# Patient Record
Sex: FEMALE | Race: WHITE | HISPANIC OR LATINO | Employment: FULL TIME | URBAN - METROPOLITAN AREA
[De-identification: names, ages, dates, MRNs, and addresses within clinical notes are randomized per-mention and may not be internally consistent; named-entity substitution may affect disease eponyms.]

---

## 2018-01-10 ENCOUNTER — OFFICE VISIT (OUTPATIENT)
Dept: URGENT CARE | Facility: CLINIC | Age: 48
End: 2018-01-10
Payer: COMMERCIAL

## 2018-01-10 VITALS
DIASTOLIC BLOOD PRESSURE: 84 MMHG | BODY MASS INDEX: 32.85 KG/M2 | HEIGHT: 61 IN | WEIGHT: 174 LBS | HEART RATE: 92 BPM | OXYGEN SATURATION: 96 % | SYSTOLIC BLOOD PRESSURE: 126 MMHG | TEMPERATURE: 97.3 F | RESPIRATION RATE: 14 BRPM

## 2018-01-10 DIAGNOSIS — J11.1 INFLUENZA-LIKE ILLNESS: ICD-10-CM

## 2018-01-10 PROCEDURE — 99214 OFFICE O/P EST MOD 30 MIN: CPT | Performed by: PHYSICIAN ASSISTANT

## 2018-01-10 RX ORDER — IBUPROFEN 600 MG/1
TABLET ORAL
Qty: 30 TAB | Refills: 0 | Status: SHIPPED | OUTPATIENT
Start: 2018-01-10 | End: 2018-01-29

## 2018-01-10 ASSESSMENT — ENCOUNTER SYMPTOMS
FATIGUE: 0
CHILLS: 1
CHANGE IN BOWEL HABIT: 1
COUGH: 1
NAUSEA: 1
VERTIGO: 0
FEVER: 0
SORE THROAT: 1
VOMITING: 1

## 2018-01-10 NOTE — PROGRESS NOTES
"Subjective:      Didi Ontiveros is a 47 y.o. female who presents with Other (runny nose,congestion dizziness, and vomiting)            Influenza   This is a new problem. The current episode started in the past 7 days. The problem occurs constantly. The problem has been gradually improving. Associated symptoms include a change in bowel habit, chills, congestion, coughing, nausea, a sore throat and vomiting. Pertinent negatives include no fatigue, fever or vertigo.   Patient reports three-day history originally of body aches, chills, sore throat and cough. Denies known fevers. States those results on Monday. Then Monday she had vomiting. That subsided Tuesday. Tuesday and today she's had loose brown liquid like stools. No blood or mucus. Denies recent travel, denies recent antibiotic use denies known sick contacts    Review of Systems   Constitutional: Positive for chills. Negative for fatigue and fever.   HENT: Positive for congestion and sore throat.    Respiratory: Positive for cough.    Gastrointestinal: Positive for change in bowel habit, nausea and vomiting.   Neurological: Negative for vertigo.          Objective:     /84   Pulse 92   Temp 36.3 °C (97.3 °F)   Resp 14   Ht 1.549 m (5' 1\")   Wt 78.9 kg (174 lb)   SpO2 96%   BMI 32.88 kg/m²      Physical Exam       Gen.: Patient is A and O ×3, no acute distress, well-nourished well-hydrated  Vitals: Are listed and unremarkable  HEENT: Heads normocephalic, atraumatic, PERRLA, extraocular movements intact, TMs and oropharynx clear  Neck: Soft supple without cervical lymphadenopathy  Cardiovascular: Regular rate and rhythm normal S1 and S2. No murmurs, rubs or gallops  Lungs are clear to auscultation bilaterally. no wheezes rales or rhonchi  Abdomen is soft, nontender, nondistended with good bowel sounds, no hepatosplenomegaly  Skin: Is well perfused without evidence of rash or lesions  Neurological:  cranial nerves II through XII were assessed and " intact.  Musculoskeletal: Full range of motion, 5 out of 5 strength against resistance  Neurovascularly: Intact with a 2 second cap refill, good distal pulses      Assessment/Plan:     1. Influenza-like illness  Patient is improving. Discussed Tommie diet, will send ibuprofen to help supportive care measures. Cough has subsided. Patient appears well. Did discuss if the symptoms persist or worsen please follow up with us as needed. Patient defers note for work- ibuprofen (MOTRIN) 600 MG Tab; Take one pill up to three times a day as needed with food  Dispense: 30 Tab; Refill: 0

## 2018-01-29 ENCOUNTER — OFFICE VISIT (OUTPATIENT)
Dept: MEDICAL GROUP | Age: 48
End: 2018-01-29
Payer: COMMERCIAL

## 2018-01-29 ENCOUNTER — HOSPITAL ENCOUNTER (OUTPATIENT)
Facility: MEDICAL CENTER | Age: 48
End: 2018-01-29
Attending: FAMILY MEDICINE
Payer: COMMERCIAL

## 2018-01-29 VITALS
BODY MASS INDEX: 33.57 KG/M2 | HEIGHT: 61 IN | SYSTOLIC BLOOD PRESSURE: 124 MMHG | TEMPERATURE: 97.9 F | HEART RATE: 89 BPM | DIASTOLIC BLOOD PRESSURE: 70 MMHG | OXYGEN SATURATION: 97 % | WEIGHT: 177.8 LBS

## 2018-01-29 DIAGNOSIS — Z12.31 ENCOUNTER FOR SCREENING MAMMOGRAM FOR BREAST CANCER: ICD-10-CM

## 2018-01-29 DIAGNOSIS — Z12.11 COLON CANCER SCREENING: ICD-10-CM

## 2018-01-29 DIAGNOSIS — E66.9 OBESITY (BMI 30-39.9): ICD-10-CM

## 2018-01-29 DIAGNOSIS — N95.0 POSTMENOPAUSAL BLEEDING: ICD-10-CM

## 2018-01-29 DIAGNOSIS — N94.10 DYSPAREUNIA IN FEMALE: ICD-10-CM

## 2018-01-29 DIAGNOSIS — N95.0 POSTMENOPAUSAL BLEEDING: Primary | ICD-10-CM

## 2018-01-29 DIAGNOSIS — G47.33 OSA (OBSTRUCTIVE SLEEP APNEA): ICD-10-CM

## 2018-01-29 DIAGNOSIS — G56.02 CARPAL TUNNEL SYNDROME OF LEFT WRIST: ICD-10-CM

## 2018-01-29 DIAGNOSIS — Z00.00 PE (PHYSICAL EXAM), ANNUAL: ICD-10-CM

## 2018-01-29 PROBLEM — R68.89 FORGETFULNESS: Status: ACTIVE | Noted: 2018-01-29

## 2018-01-29 PROBLEM — Z87.42 HX OF OVARIAN CYST: Status: ACTIVE | Noted: 2018-01-29

## 2018-01-29 LAB
INT CON NEG: NEGATIVE
INT CON POS: POSITIVE
POC URINE PREGNANCY TEST: NEGATIVE

## 2018-01-29 PROCEDURE — 87510 GARDNER VAG DNA DIR PROBE: CPT

## 2018-01-29 PROCEDURE — 87660 TRICHOMONAS VAGIN DIR PROBE: CPT

## 2018-01-29 PROCEDURE — 87591 N.GONORRHOEAE DNA AMP PROB: CPT

## 2018-01-29 PROCEDURE — 81025 URINE PREGNANCY TEST: CPT | Performed by: FAMILY MEDICINE

## 2018-01-29 PROCEDURE — 99215 OFFICE O/P EST HI 40 MIN: CPT | Mod: 25 | Performed by: FAMILY MEDICINE

## 2018-01-29 PROCEDURE — 99000 SPECIMEN HANDLING OFFICE-LAB: CPT | Performed by: FAMILY MEDICINE

## 2018-01-29 PROCEDURE — 87491 CHLMYD TRACH DNA AMP PROBE: CPT

## 2018-01-29 PROCEDURE — 87480 CANDIDA DNA DIR PROBE: CPT

## 2018-01-29 ASSESSMENT — PATIENT HEALTH QUESTIONNAIRE - PHQ9
5. POOR APPETITE OR OVEREATING: 0 - NOT AT ALL
CLINICAL INTERPRETATION OF PHQ2 SCORE: 1
SUM OF ALL RESPONSES TO PHQ QUESTIONS 1-9: 4

## 2018-01-30 ENCOUNTER — TELEPHONE (OUTPATIENT)
Dept: MEDICAL GROUP | Age: 48
End: 2018-01-30

## 2018-01-30 LAB
C TRACH DNA SPEC QL NAA+PROBE: NEGATIVE
CANDIDA DNA VAG QL PROBE+SIG AMP: NEGATIVE
G VAGINALIS DNA VAG QL PROBE+SIG AMP: NEGATIVE
N GONORRHOEA DNA SPEC QL NAA+PROBE: NEGATIVE
SPECIMEN SOURCE: NORMAL
T VAGINALIS DNA VAG QL PROBE+SIG AMP: NEGATIVE

## 2018-01-30 NOTE — PATIENT INSTRUCTIONS
Osteoporosis  Osteoporosis is the thinning and loss of density in the bones. Osteoporosis makes the bones more brittle, fragile, and likely to break (fracture). Over time, osteoporosis can cause the bones to become so weak that they fracture after a simple fall. The bones most likely to fracture are the bones in the hip, wrist, and spine.  CAUSES   The exact cause is not known.  RISK FACTORS  Anyone can develop osteoporosis. You may be at greater risk if you have a family history of the condition or have poor nutrition. You may also have a higher risk if you are:   · Female.    · 50 years old or older.  · A smoker.  · Not physically active.    · White or .  · Slender.  SIGNS AND SYMPTOMS   A fracture might be the first sign of the disease, especially if it results from a fall or injury that would not usually cause a bone to break. Other signs and symptoms include:   · Low back and neck pain.  · Stooped posture.  · Height loss.  DIAGNOSIS   To make a diagnosis, your health care provider may:  · Take a medical history.  · Perform a physical exam.  · Order tests, such as:  ¨ A bone mineral density test.  ¨ A dual-energy X-ray absorptiometry test.  TREATMENT   The goal of osteoporosis treatment is to strengthen your bones to reduce your risk of a fracture. Treatment may involve:  · Making lifestyle changes, such as:  ¨ Eating a diet rich in calcium.  ¨ Doing weight-bearing and muscle-strengthening exercises.  ¨ Stopping tobacco use.  ¨ Limiting alcohol intake.  · Taking medicine to slow the process of bone loss or to increase bone density.  · Monitoring your levels of calcium and vitamin D.  HOME CARE INSTRUCTIONS  · Include calcium and vitamin D in your diet. Calcium is important for bone health, and vitamin D helps the body absorb calcium.  · Perform weight-bearing and muscle-strengthening exercises as directed by your health care provider.  · Do not use any tobacco products, including cigarettes, chewing  tobacco, and electronic cigarettes. If you need help quitting, ask your health care provider.  · Limit your alcohol intake.  · Take medicines only as directed by your health care provider.  · Keep all follow-up visits as directed by your health care provider. This is important.  · Take precautions at home to lower your risk of falling, such as:  ¨ Keeping rooms well lit and clutter free.  ¨ Installing safety rails on stairs.  ¨ Using rubber mats in the bathroom and other areas that are often wet or slippery.  SEEK IMMEDIATE MEDICAL CARE IF:   You fall or injure yourself.      This information is not intended to replace advice given to you by your health care provider. Make sure you discuss any questions you have with your health care provider.     Document Released: 09/27/2006 Document Revised: 01/08/2016 Document Reviewed: 05/28/2015  [employer] Interactive Patient Education ©2016 [employer]

## 2018-01-30 NOTE — PROGRESS NOTES
Subjective:   CC: establish care    HPI:     Didi Ontiveros is a 48 y.o. female, established patient of the clinic, presents with the following concerns:     Pt is  sexually active, using condoms for contraception. Pt states that she stopped having menstruation for 12 months. She was doing well until 2017 when she suffers acute, heavy vaginal bleeding for 24 hours. Vaginal bleeding stopped after that. She denies pelvis pain, hx of STD, abnormal vaginal discharge, unexplained weight loss, depression or familial history of gynecological malignancy. She endorses new onset of dyspareunia approximately 4 months ago. She states that she has deep pelvis pain during sex if she lies on her back. Other position during sex does not bother her. She has hx of ovarian cyst, s/p surgical removal in . She otherwise doing well.     She also c/o abnormal weight gain over the past years. She tries to eat better, but it is not easy as she is very busy at work. She sometimes forgets lunch and eats more during dinner. She is active, but no regular exercise. She is planning to join the gym in a few weeks. She is interested in [facility] referral for weight loss.     Patient complains of chronic poor memory, forgetfulness, poor concentration, excessive sleepiness during the day, feeling unrefreshed in the morning after 6-8 hours of sleep, excessive nocturnal snoring, frequent episodes of nocturnal arousal gasping for air.     She complains of intermittent left wrist tenderness and numbness/tingling sensation frequently felt along the left first to third digits with certain motion or movement of the left hand. Pt is right-handed. She works for [employer]. She endorses excessive computer usage at work. She also carries heavy boxes from times to times. Denies hx of trauma or surgery of the left wrist, left hand weakness or poor coordination. Left elbow/shouler/neck are asymptomatic.     Current medicines (including  "changes today)  Current Outpatient Prescriptions   Medication Sig Dispense Refill   • Diclofenac Sodium 1 % Gel Apply to 2-4 gram to affected area 4 times daily 100 g 1     No current facility-administered medications for this visit.      She  has no past medical history on file.    I personally reviewed patient's problem list, allergies, medications, family hx, social hx with patient and update EPIC.     REVIEW OF SYSTEMS:  CONSTITUTIONAL:  Denies night sweats, fatigue, malaise, lethargy, fever or chills.  RESPIRATORY:  Denies cough, wheeze, hemoptysis, or shortness of breath.  CARDIOVASCULAR:  Denies chest pains, palpitations, pedal edema     Objective:     Blood pressure 124/70, pulse 89, temperature 36.6 °C (97.9 °F), height 1.554 m (5' 1.2\"), weight 80.6 kg (177 lb 12.8 oz), SpO2 97 %. Body mass index is 33.38 kg/m².    Physical Exam:  Constitutional: awake, alert, in no distress.  Skin: Warm, dry, good turgor, no rashes, bruises, ulcers in visible areas.  Eye: conjunctiva clear, lids neg for edema or lesions.  Neck: Trachea midline, no masses, no thyromegaly. No cervical or supraclavicular lymphadenopathy  Respiratory: Unlabored respiratory effort, lungs clear to auscultation, no wheezes, no rhonchi, no rales.  Cardiovascular: Normal S1, S2, no murmur, no pedal edema.  Abdomen: Soft, non-tender to palpation, active BS, no hernia, no hepatosplenomegaly, negative rebound or guarding.   Psych: Oriented x3, affect and mood wnl, intact judgement and insight.   MSK: No visible deformity or concerning lesion on the hands bilaterally. Hands are warm and well-perfused. Positive Phalen's and Tinel's test at the left wrist, negative thenar atrophy, intact neuromuscular exam.     Assessment and Plan:   The following treatment plan was discussed    1. Postmenopausal bleeding   sexually active, menopausal, who presents with post-menopausal bleeding for 24 hours. Pt presentation is concerning for endometrial hyperplasia " or malignancy. In-office urine pregnancy test was negative. Pelvis exam was not performed due to time constraint. Will do pelvis exam at next visit for pap.  Plans:   - US-OB PELVIS TRANSVAGINAL; Future  - CHLAMYDIA/GC PCR URINE OR SWAB; Future  - VAGINAL PATHOGENS DNA PANEL; Future    2. Obesity (BMI 30-39.9)  - Patient identified as having weight management issue.  Appropriate orders and counseling given.  - REFERRAL TO [facility] (HIP[facility] Services Requested: [facility] Staff to Evaluate Best Program; Reason for Visit: Overweight/Obesity    3. Dyspareunia in female  - US-OB PELVIS TRANSVAGINAL; Future  - CHLAMYDIA/GC PCR URINE OR SWAB; Future  - VAGINAL PATHOGENS DNA PANEL; Future    4. Encounter for screening mammogram for breast cancer  - MA-SCREEN MAMMO W/CAD-BILAT; Future    5. Colon cancer screening  - REFERRAL TO GI FOR COLONOSCOPY    6. ROMEO  History and exam is concerning for obstructive sleep apnea. Plans:  - OVERNIGHT OXIMETRY; Future  - Weight loss    7. Carpal tunnel syndrome of left wrist  History and exam is concerning for carpal tunnel of the left wrist. Plans:  - Diclofenac Sodium 1 % Gel; Apply to 2-4 gram to affected area 4 times daily  Dispense: 100 g; Refill: 1  - home rehab exercises provided  - wrist splint   - f/u in 3 months     Total 40 minutes face-to-face time spent with patient, with greater than 50% of the total time discussing patient's issues and symptoms as listed above in assessment and plan, as well as managing coordination of care for future evaluation and treatment.      Ashley Hook M.D.      Followup: Return in about 3 months (around 4/29/2018) for Multiple issues, Pap.    Please note that this dictation was created using voice recognition software. I have made every reasonable attempt to correct obvious errors, but I expect that there are errors of grammar and possibly content that I did not discover before finalizing the note.

## 2018-02-20 ENCOUNTER — TELEPHONE (OUTPATIENT)
Dept: MEDICAL GROUP | Age: 48
End: 2018-02-20

## 2018-02-21 NOTE — TELEPHONE ENCOUNTER
MEDICATION PRIOR AUTHORIZATION NEEDED:    1. Name of Medication: Diclofenac Sodium 1 % Gel    2. Requested By (Name of Pharmacy):   [id number] - [place], [place] - [address]  [address] [place] [address]  Phone: [phone] Fax: [phone]     3. Is insurance on file current? Cardholder ID [id number]     4. What is the name & phone number of the 3rd party payor? [facility] [phone]    Would you like a PAR started?

## 2018-02-23 DIAGNOSIS — E66.9 OBESITY (BMI 30-39.9): ICD-10-CM

## 2018-02-28 NOTE — TELEPHONE ENCOUNTER
DOCUMENTATION OF PAR STATUS:    1. Name of Medication & Dose: Diclofenac Sodium 1 % Gel     2. Name of Prescription Coverage Company & phone #: [email or web address] [phone]    3. Date Prior Auth Submitted: 02/27/18    4. What information was given to obtain insurance decision? Diagnosis code    5. Prior Auth Status? Pending    6. Patient Notified: no

## 2018-03-02 ENCOUNTER — TELEPHONE (OUTPATIENT)
Dept: MEDICAL GROUP | Age: 48
End: 2018-03-02

## 2018-03-03 NOTE — TELEPHONE ENCOUNTER
Phone Number Called: [phone] (home)       Message: LM for patient that her insurance denied the diclofenac gel.  Dr. Hook can print [facility].      Left Message for patient to call back: yes

## 2018-05-18 ENCOUNTER — TELEPHONE (OUTPATIENT)
Dept: MEDICAL GROUP | Age: 48
End: 2018-05-18

## 2018-05-18 NOTE — TELEPHONE ENCOUNTER
2. Overnight Pulse Oximetry paperwork received from [employer] requiring provider signature.     3. All appropriate fields completed by Medical Assistant: Yes    4. Paperwork NOTE ATTACHED TO ENCOUNTER: [facility]- Unable to schedule patient .  Called and spoke with patient, she informed she thought they were going to call her back, since she had missed them the first time when they came to drop office device. Patient was provided with contact information to call and schedule OPO test.

## 2018-05-23 ENCOUNTER — HOSPITAL ENCOUNTER (OUTPATIENT)
Dept: RADIOLOGY | Facility: MEDICAL CENTER | Age: 48
End: 2018-05-23
Attending: FAMILY MEDICINE
Payer: COMMERCIAL

## 2018-05-23 ENCOUNTER — HOSPITAL ENCOUNTER (OUTPATIENT)
Dept: RADIOLOGY | Facility: MEDICAL CENTER | Age: 48
End: 2018-05-23

## 2018-05-23 DIAGNOSIS — N95.0 POSTMENOPAUSAL BLEEDING: ICD-10-CM

## 2018-05-23 DIAGNOSIS — Z12.31 ENCOUNTER FOR SCREENING MAMMOGRAM FOR BREAST CANCER: ICD-10-CM

## 2018-05-23 DIAGNOSIS — N94.10 DYSPAREUNIA IN FEMALE: ICD-10-CM

## 2018-05-23 PROCEDURE — 76830 TRANSVAGINAL US NON-OB: CPT

## 2018-05-23 PROCEDURE — 77067 SCR MAMMO BI INCL CAD: CPT

## 2018-05-24 DIAGNOSIS — R93.89 THICKENED ENDOMETRIUM: ICD-10-CM

## 2018-05-24 LAB
25(OH)D3+25(OH)D2 SERPL-MCNC: 23.9 NG/ML (ref 30–100)
ALBUMIN SERPL-MCNC: 4.7 G/DL (ref 3.5–5.5)
ALBUMIN/GLOB SERPL: 1.6 {RATIO} (ref 1.2–2.2)
ALP SERPL-CCNC: 88 IU/L (ref 39–117)
ALT SERPL-CCNC: 118 IU/L (ref 0–32)
AST SERPL-CCNC: 60 IU/L (ref 0–40)
BASOPHILS # BLD AUTO: 0.1 X10E3/UL (ref 0–0.2)
BASOPHILS NFR BLD AUTO: 1 %
BILIRUB SERPL-MCNC: 0.4 MG/DL (ref 0–1.2)
BUN SERPL-MCNC: 9 MG/DL (ref 6–24)
BUN/CREAT SERPL: 13 (ref 9–23)
CALCIUM SERPL-MCNC: 9.4 MG/DL (ref 8.7–10.2)
CHLORIDE SERPL-SCNC: 101 MMOL/L (ref 96–106)
CHOLEST SERPL-MCNC: 191 MG/DL (ref 100–199)
CO2 SERPL-SCNC: 24 MMOL/L (ref 18–29)
CREAT SERPL-MCNC: 0.69 MG/DL (ref 0.57–1)
EOSINOPHIL # BLD AUTO: 0.2 X10E3/UL (ref 0–0.4)
EOSINOPHIL NFR BLD AUTO: 2 %
ERYTHROCYTE [DISTWIDTH] IN BLOOD BY AUTOMATED COUNT: 13.9 % (ref 12.3–15.4)
GFR SERPLBLD CREATININE-BSD FMLA CKD-EPI: 102 ML/MIN/1.73
GFR SERPLBLD CREATININE-BSD FMLA CKD-EPI: 117 ML/MIN/1.73
GLOBULIN SER CALC-MCNC: 3 G/DL (ref 1.5–4.5)
GLUCOSE SERPL-MCNC: 88 MG/DL (ref 65–99)
HBA1C MFR BLD: 6.2 % (ref 4.8–5.6)
HCT VFR BLD AUTO: 43.6 % (ref 34–46.6)
HDLC SERPL-MCNC: 36 MG/DL
HGB BLD-MCNC: 15 G/DL (ref 11.1–15.9)
IMM GRANULOCYTES # BLD: 0 X10E3/UL (ref 0–0.1)
IMM GRANULOCYTES NFR BLD: 0 %
IMMATURE CELLS  115398: NORMAL
LABORATORY COMMENT REPORT: ABNORMAL
LDLC SERPL CALC-MCNC: 109 MG/DL (ref 0–99)
LYMPHOCYTES # BLD AUTO: 2.7 X10E3/UL (ref 0.7–3.1)
LYMPHOCYTES NFR BLD AUTO: 28 %
MCH RBC QN AUTO: 31.3 PG (ref 26.6–33)
MCHC RBC AUTO-ENTMCNC: 34.4 G/DL (ref 31.5–35.7)
MCV RBC AUTO: 91 FL (ref 79–97)
MONOCYTES # BLD AUTO: 0.5 X10E3/UL (ref 0.1–0.9)
MONOCYTES NFR BLD AUTO: 6 %
MORPHOLOGY BLD-IMP: NORMAL
NEUTROPHILS # BLD AUTO: 6.1 X10E3/UL (ref 1.4–7)
NEUTROPHILS NFR BLD AUTO: 63 %
NRBC BLD AUTO-RTO: NORMAL %
PLATELET # BLD AUTO: 307 X10E3/UL (ref 150–379)
POTASSIUM SERPL-SCNC: 3.8 MMOL/L (ref 3.5–5.2)
PROT SERPL-MCNC: 7.7 G/DL (ref 6–8.5)
RBC # BLD AUTO: 4.8 X10E6/UL (ref 3.77–5.28)
SODIUM SERPL-SCNC: 142 MMOL/L (ref 134–144)
TRIGL SERPL-MCNC: 230 MG/DL (ref 0–149)
TSH SERPL DL<=0.005 MIU/L-ACNC: 1.64 UIU/ML (ref 0.45–4.5)
VLDLC SERPL CALC-MCNC: 46 MG/DL (ref 5–40)
WBC # BLD AUTO: 9.5 X10E3/UL (ref 3.4–10.8)

## 2018-05-25 PROBLEM — R73.03 PREDIABETES: Status: ACTIVE | Noted: 2018-05-25

## 2018-05-25 PROBLEM — R79.89 ELEVATED LFTS: Status: ACTIVE | Noted: 2018-05-25

## 2018-05-25 PROBLEM — E55.9 VITAMIN D INSUFFICIENCY: Status: ACTIVE | Noted: 2018-05-25

## 2018-05-25 PROBLEM — E78.00 PURE HYPERCHOLESTEROLEMIA: Status: ACTIVE | Noted: 2018-05-25

## 2018-05-31 DIAGNOSIS — G47.33 OSA (OBSTRUCTIVE SLEEP APNEA): ICD-10-CM

## 2018-06-04 ENCOUNTER — OFFICE VISIT (OUTPATIENT)
Dept: MEDICAL GROUP | Age: 48
End: 2018-06-04
Payer: COMMERCIAL

## 2018-06-04 VITALS
TEMPERATURE: 97.7 F | HEIGHT: 61 IN | OXYGEN SATURATION: 94 % | WEIGHT: 179 LBS | SYSTOLIC BLOOD PRESSURE: 120 MMHG | HEART RATE: 84 BPM | BODY MASS INDEX: 33.79 KG/M2 | DIASTOLIC BLOOD PRESSURE: 78 MMHG

## 2018-06-04 DIAGNOSIS — E66.9 OBESITY (BMI 30-39.9): ICD-10-CM

## 2018-06-04 DIAGNOSIS — E78.00 PURE HYPERCHOLESTEROLEMIA: ICD-10-CM

## 2018-06-04 DIAGNOSIS — G56.02 CARPAL TUNNEL SYNDROME OF LEFT WRIST: ICD-10-CM

## 2018-06-04 DIAGNOSIS — Z23 NEED FOR TDAP VACCINATION: ICD-10-CM

## 2018-06-04 DIAGNOSIS — G47.33 OSA (OBSTRUCTIVE SLEEP APNEA): Primary | ICD-10-CM

## 2018-06-04 DIAGNOSIS — K75.81 NASH (NONALCOHOLIC STEATOHEPATITIS): ICD-10-CM

## 2018-06-04 DIAGNOSIS — R73.03 PREDIABETES: ICD-10-CM

## 2018-06-04 DIAGNOSIS — R93.89 THICKENED ENDOMETRIUM: ICD-10-CM

## 2018-06-04 PROBLEM — R68.89 FORGETFULNESS: Status: RESOLVED | Noted: 2018-01-29 | Resolved: 2018-06-04

## 2018-06-04 PROCEDURE — 99214 OFFICE O/P EST MOD 30 MIN: CPT | Mod: 25 | Performed by: FAMILY MEDICINE

## 2018-06-04 PROCEDURE — 90715 TDAP VACCINE 7 YRS/> IM: CPT | Performed by: FAMILY MEDICINE

## 2018-06-04 PROCEDURE — 90471 IMMUNIZATION ADMIN: CPT | Performed by: FAMILY MEDICINE

## 2018-06-04 RX ORDER — METFORMIN HYDROCHLORIDE 500 MG/1
500 TABLET, EXTENDED RELEASE ORAL 2 TIMES DAILY
Qty: 180 TAB | Refills: 1 | Status: SHIPPED | OUTPATIENT
Start: 2018-06-04 | End: 2019-05-08

## 2018-06-04 RX ORDER — OMEGA-3 FATTY ACIDS CAP DELAYED RELEASE 1000 MG 1000 MG
1000 CAPSULE DELAYED RELEASE ORAL 2 TIMES DAILY
Qty: 180 CAP | Refills: 1 | Status: SHIPPED | OUTPATIENT
Start: 2018-06-04

## 2018-06-04 NOTE — PATIENT INSTRUCTIONS
Call this number to schedule appointment with OBGYN and with sleep medicine:   [facility]  Phone: [phone]

## 2018-06-08 ENCOUNTER — TELEPHONE (OUTPATIENT)
Dept: MEDICAL GROUP | Age: 48
End: 2018-06-08

## 2018-06-08 NOTE — TELEPHONE ENCOUNTER
1. Caller Name: [employer]                                               Patient approves a detailed voicemail message: N\A    recieved a fax from [employer] stating pt has diabetes and is not on statin therapy

## 2018-12-04 ENCOUNTER — APPOINTMENT (OUTPATIENT)
Dept: MEDICAL GROUP | Age: 48
End: 2018-12-04
Payer: COMMERCIAL

## 2019-02-01 ENCOUNTER — APPOINTMENT (OUTPATIENT)
Dept: MEDICAL GROUP | Age: 49
End: 2019-02-01
Payer: COMMERCIAL

## 2019-05-05 ENCOUNTER — OFFICE VISIT (OUTPATIENT)
Dept: URGENT CARE | Facility: PHYSICIAN GROUP | Age: 49
End: 2019-05-05
Payer: COMMERCIAL

## 2019-05-05 VITALS
SYSTOLIC BLOOD PRESSURE: 118 MMHG | HEIGHT: 61 IN | BODY MASS INDEX: 34.17 KG/M2 | TEMPERATURE: 99 F | WEIGHT: 181 LBS | RESPIRATION RATE: 16 BRPM | DIASTOLIC BLOOD PRESSURE: 74 MMHG | OXYGEN SATURATION: 94 %

## 2019-05-05 DIAGNOSIS — M54.32 LEFT SIDED SCIATICA: ICD-10-CM

## 2019-05-05 PROCEDURE — 99214 OFFICE O/P EST MOD 30 MIN: CPT | Performed by: PHYSICIAN ASSISTANT

## 2019-05-05 RX ORDER — METHOCARBAMOL 500 MG/1
TABLET, FILM COATED ORAL
Qty: 30 TAB | Refills: 0 | Status: SHIPPED | OUTPATIENT
Start: 2019-05-05 | End: 2019-06-10

## 2019-05-05 RX ORDER — NAPROXEN 500 MG/1
500 TABLET ORAL 2 TIMES DAILY WITH MEALS
Qty: 30 TAB | Refills: 0 | Status: SHIPPED | OUTPATIENT
Start: 2019-05-05 | End: 2019-06-10

## 2019-05-05 RX ORDER — KETOROLAC TROMETHAMINE 30 MG/ML
60 INJECTION, SOLUTION INTRAMUSCULAR; INTRAVENOUS ONCE
Status: COMPLETED | OUTPATIENT
Start: 2019-05-05 | End: 2019-05-05

## 2019-05-05 RX ADMIN — KETOROLAC TROMETHAMINE 60 MG: 30 INJECTION, SOLUTION INTRAMUSCULAR; INTRAVENOUS at 16:22

## 2019-05-05 ASSESSMENT — ENCOUNTER SYMPTOMS
CONSTITUTIONAL NEGATIVE: 1
RESPIRATORY NEGATIVE: 1
NEUROLOGICAL NEGATIVE: 1
GASTROINTESTINAL NEGATIVE: 1
CARDIOVASCULAR NEGATIVE: 1

## 2019-05-05 NOTE — PROGRESS NOTES
Subjective:      Didi Ontiveros is a 49 y.o. female who presents with Low Back Pain (left side lbp that travels down to the knee since Wednesday. )        HPI   Patient presents today for about 5 days of left lower back/buttock pain. Sometimes the pain radiates around to the hip and sometimes it radiates along the thigh to the knee. No lower leg pain or swelling.  No numbness, tingling or weakness of the legs.  She denies any specific injury but has been going to the gym more. She states she does not recall anything specifically she may have done to cause this.  3 weeks ago she had a similar, more mild episode.  She took Advil and applied some cream and that helped.   She has attempted the same this time but has not seemed to help.    She notes it is worse with walking, bending and twisting certain ways and turning over in bed.  No chronic back pain or issues.     Review of Systems   Constitutional: Negative.    Respiratory: Negative.    Cardiovascular: Negative.    Gastrointestinal: Negative.    Genitourinary: Negative.    Musculoskeletal:        SEE HPI   Skin: Negative.    Neurological: Negative.    Endo/Heme/Allergies: Negative.        PMH:  has no past medical history on file.  MEDS:   Current Outpatient Prescriptions:   •  naproxen (NAPROSYN) 500 MG Tab, Take 1 Tab by mouth 2 times a day, with meals. PRN, Disp: 30 Tab, Rfl: 0  •  methocarbamol (ROBAXIN) 500 MG Tab, 1-2 tablets at bedtime prn, no driving, Disp: 30 Tab, Rfl: 0  •  Diclofenac Sodium 1 % Gel, Apply to 2-4 gram to affected area 4 times daily, Disp: 100 g, Rfl: 1  •  metFORMIN ER (GLUCOPHAGE XR) 500 MG TABLET SR 24 HR, Take 1 Tab by mouth 2 times a day., Disp: 180 Tab, Rfl: 1  •  Omega-3 Fatty Acids (FISH OIL) 1000 MG CAPSULE DELAYED RELEASE EC softgel capsule, Take 1 Cap by mouth 2 Times a Day., Disp: 180 Cap, Rfl: 1  ALLERGIES:   Allergies   Allergen Reactions   • Pcn [Penicillins]      SURGHX:   Past Surgical History:   Procedure Laterality  "Date   • PRIMARY C SECTION       SOCHX:  reports that she has never smoked. She has never used smokeless tobacco. She reports that she does not drink alcohol or use drugs.  FH: Family history was reviewed, no pertinent findings to report   Objective:     /74   Temp 37.2 °C (99 °F) (Temporal)   Resp 16   Ht 1.549 m (5' 1\")   Wt 82.1 kg (181 lb)   SpO2 94%   BMI 34.20 kg/m²      Physical Exam   Constitutional: She is oriented to person, place, and time. She appears well-developed and well-nourished. No distress.   HENT:   Head: Normocephalic and atraumatic.   Right Ear: External ear normal.   Left Ear: External ear normal.   Eyes: Conjunctivae and EOM are normal.   Neck: Normal range of motion. Neck supple.   Cardiovascular: Normal rate and regular rhythm.    Pulmonary/Chest: Effort normal and breath sounds normal.   Musculoskeletal:        Back:    Neurological: She is alert and oriented to person, place, and time.   Skin: Skin is warm and dry.   Psychiatric: She has a normal mood and affect. Her behavior is normal.   Vitals reviewed.              Assessment/Plan:     1. Left sided sciatica  ketorolac (TORADOL) injection 60 mg    naproxen (NAPROSYN) 500 MG Tab    methocarbamol (ROBAXIN) 500 MG Tab         -Toradol shot given in clinic, patient tolerated well.  Monitored for 10 mins s/p shot.   -back care discussed, proper lifting mechanics discussed  -recommend heat/ice prn.  Gentle stretches daily.   -Robaxin if needed for bedtime/spasms. No driving, caution drowsy.   -back pain red flags and ER precautions discussed with patient.   -patient has appt in 1 month with her PCP and will keep this.  RTC precautions in meantime.       Supportive care, differential diagnoses, and indications for immediate follow-up discussed with patient.   Pathogenesis of diagnosis discussed including typical length and natural progression.   Instructed to return to clinic or nearest emergency department for any change in " condition, further concerns, or worsening of symptoms.  Patient states understanding of the plan of care and discharge instructions.      Celina Ferreira P.A.-C.

## 2019-05-05 NOTE — LETTER
May 5, 2019         Patient: Didi Ontiveros   YOB: 1970   Date of Visit: 5/5/2019           To Whom it May Concern:    Didi Ontiveros was seen in my clinic on 5/5/2019.  Please excuse her from missed work tomorrow.    If you have any questions or concerns, please don't hesitate to call.        Sincerely,           Celina Ferreira P.A.-C.  Electronically Signed

## 2019-05-08 ENCOUNTER — OFFICE VISIT (OUTPATIENT)
Dept: MEDICAL GROUP | Age: 49
End: 2019-05-08
Payer: COMMERCIAL

## 2019-05-08 VITALS
TEMPERATURE: 98 F | BODY MASS INDEX: 35.12 KG/M2 | SYSTOLIC BLOOD PRESSURE: 104 MMHG | HEART RATE: 65 BPM | DIASTOLIC BLOOD PRESSURE: 66 MMHG | WEIGHT: 186 LBS | OXYGEN SATURATION: 93 % | HEIGHT: 61 IN

## 2019-05-08 DIAGNOSIS — N95.0 POSTMENOPAUSAL BLEEDING: ICD-10-CM

## 2019-05-08 DIAGNOSIS — G56.02 CARPAL TUNNEL SYNDROME OF LEFT WRIST: ICD-10-CM

## 2019-05-08 DIAGNOSIS — K75.81 NASH (NONALCOHOLIC STEATOHEPATITIS): ICD-10-CM

## 2019-05-08 DIAGNOSIS — Z00.00 PE (PHYSICAL EXAM), ANNUAL: Primary | ICD-10-CM

## 2019-05-08 DIAGNOSIS — R93.89 THICKENED ENDOMETRIUM: ICD-10-CM

## 2019-05-08 DIAGNOSIS — S39.012A LUMBOSACRAL STRAIN, INITIAL ENCOUNTER: ICD-10-CM

## 2019-05-08 DIAGNOSIS — E66.9 OBESITY (BMI 30-39.9): ICD-10-CM

## 2019-05-08 DIAGNOSIS — R73.03 PREDIABETES: ICD-10-CM

## 2019-05-08 DIAGNOSIS — E78.00 PURE HYPERCHOLESTEROLEMIA: ICD-10-CM

## 2019-05-08 DIAGNOSIS — G47.33 OSA (OBSTRUCTIVE SLEEP APNEA): ICD-10-CM

## 2019-05-08 PROBLEM — N94.10 DYSPAREUNIA IN FEMALE: Status: RESOLVED | Noted: 2018-01-29 | Resolved: 2019-05-08

## 2019-05-08 PROBLEM — E55.9 VITAMIN D INSUFFICIENCY: Status: RESOLVED | Noted: 2018-05-25 | Resolved: 2019-05-08

## 2019-05-08 PROBLEM — Z87.42 HX OF OVARIAN CYST: Status: RESOLVED | Noted: 2018-01-29 | Resolved: 2019-05-08

## 2019-05-08 PROCEDURE — 99396 PREV VISIT EST AGE 40-64: CPT | Performed by: FAMILY MEDICINE

## 2019-05-08 ASSESSMENT — PATIENT HEALTH QUESTIONNAIRE - PHQ9: CLINICAL INTERPRETATION OF PHQ2 SCORE: 0

## 2019-05-08 NOTE — PROGRESS NOTES
Subjective:   CC: Annual PE    HPI:     Didi Ontiveros is a 49 y.o. female who is an established patient of the clinic, presents with the following concerns:     Overall, the patient is doing well today with the exception of acute low back pain. One week ago, she developed an acute onset of left sided low back pain. She has started going to the gym two months ago but denies any heavy lifting, trauma, injury or falls. Her pain has gradually worsened and is described as a deep burning pain radiating to her left groin and down her right posterior lower extremity. She was treated with a Toradol injection by urgent care which resolved her pain for one day. Her pain has now returned and is described as constant with minimal improvement with Naproxen. No groin numbness, bowel or bladder incontinence, lower extremity weakness or tingling.     She was previously diagnosed with carpal tunnel syndrome of the left wrist. She continues to utilize night splints and exercises for her left wrist regularly. Her pain is under good control at this time and does not feel that she requires surgical intervention at this time.      She has a history of hypercholesterolemia which is managed via diet modification and exercise. She is not currently on a statin but does take an omega 3 fatty acid supplement on a daily basis. Additionally, she has a history of nonalcoholic steatohepatitis. No acute complaints or concerns were reported.    Prior history of prediabetes. Her A1c was 6.2 on 05/23/18. No recent labs were completed to reevaluate her glycohemoglobin. She was previously prescribed Metformin but did not start this medication. She forgot about this medication stating she was under significant stress one year ago. She has started exercising regularly two months ago at a gym. She reports a 5 lbs weight loss one week ago. She is interested in attending a weight loss management program.     When she was evaluated last in 06/2018, she  "complained of vaginal bleeding which resolved after passing a large clot. She did not follow up with gynecology for further evaluation however. No recurrent vaginal bleeding, pelvic pain, genital rash, abnormal vaginal discharge or unexplained weight loss. No personal or familial history of breast/gynecological malignancy. She is due for a pap smear.      Current medicines (including changes today)  Current Outpatient Prescriptions   Medication Sig Dispense Refill   • naproxen (NAPROSYN) 500 MG Tab Take 1 Tab by mouth 2 times a day, with meals. PRN 30 Tab 0   • methocarbamol (ROBAXIN) 500 MG Tab 1-2 tablets at bedtime prn, no driving 30 Tab 0   • Omega-3 Fatty Acids (FISH OIL) 1000 MG CAPSULE DELAYED RELEASE EC softgel capsule Take 1 Cap by mouth 2 Times a Day. 180 Cap 1     No current facility-administered medications for this visit.      She  has no past medical history on file.    I personally reviewed patient's problem list, allergies, medications, family hx, social hx with patient and update EPIC.     REVIEW OF SYSTEMS:  CONSTITUTIONAL:  Denies night sweats, fatigue, malaise, lethargy, fever or chills.  RESPIRATORY:  Denies cough, wheeze, hemoptysis or shortness of breath.  CARDIOVASCULAR:  Denies chest pains, palpitations or pedal edema.  : Denies vaginal bleeding, pelvic pain, genital rash or abnormal vaginal discharge.  MUSCULOSKELETAL: Denies trauma, injury or falls.  NEUROLOGICAL: Denies groin numbness, bowel or bladder incontinence, lower extremity weakness or tingling.     See HPI for further details.         Objective:     /66 (BP Location: Right arm, Patient Position: Sitting, BP Cuff Size: Adult)   Pulse 65   Temp 36.7 °C (98 °F) (Temporal)   Ht 1.549 m (5' 1\")   Wt 84.4 kg (186 lb)   SpO2 93%  Body mass index is 35.14 kg/m².    Physical Exam:  Constitutional: awake, alert, in no distress, obese  Skin: Warm, dry, good turgor, no rashes, bruises, ulcers in visible areas.  Eye: conjunctiva " clear, lids neg for edema or lesions.  ENMT: Lips without lesions.  Neck: Trachea midline, no masses, no thyromegaly. No cervical or supraclavicular lymphadenopathy  Respiratory: Unlabored respiratory effort, lungs clear to auscultation, no wheezes, no rales.  Cardiovascular: Normal S1, S2, no murmur, no pedal edema.  Neurological: Positive right straight leg raise. Reflexes intact.   Psych: Oriented x3, affect and mood wnl, intact judgement and insight.   MSK:   No visible deformities of the spine, negative hematoma, lumbosacral ROM mildly limited due to pain, lumbar spine are not tender to palpation, paraspinal muscles are not tender to palpation, negative CVA tenderness to percussion, Quadriceps strength: 5/5 bilaterally, Psoas strength: 5/5 bilaterally.  Strong resisted toe extension, strong resisted plantar flexion. Patella reflexes are 1/4 bilaterally, Achilles tendon reflexes: 1/4 on bilaterally. Straight leg raise: Negative on the left, negative on the right. Sensory perception to light touch: Intact along L2, L3, L4, L5, and S1 dermatomes.        Assessment and Plan:   The following treatment plan was discussed:    1. Carpal tunnel syndrome of left wrist  Chronic, improving with conservative management, will continue conservative management at this time.    2. HART (nonalcoholic steatohepatitis)  History of Hart, confirmed by right upper quadrant ultrasound done in January 2014.  Blood test done in May 2018 was notable for elevated LFT.  Blood tests ordered to rule out hepatitis B and hepatitis C were not completed.  Plans:  - HEP B CORE AB IGM; Future  - HEP B CORE AB TOTAL; Future  - HEP B SURFACE AB; Future  - HEP B SURFACE ANTIGEN; Future  - HEP C VIRUS ANTIBODY; Future    3. Obesity (BMI 30-39.9)  - REFERRAL TO [facility] IMPROVEMENT PROGRAMS (HIP) Services Requested: Medical Weight Management Program, Physician Medical Weight Management Program, Registered Dietitian for Medical Nutrition Therapy;  Reason for Referral? BMI>30; Reason for Vi...    4.  Abnormal overnight oximetry study concerning for sleep apnea  Patient was found to have abnormal overnight oximetry study in May 2018.  She was referred to sleep study.  However, patient decided against further evaluation for sleep apnea at this time.  Recommended weight loss.    5. Prediabetes  A1c was 6.2 in May 2018.  - Pt was counseled on dietary modification, weight loss   - Recommended moderate intensity exercise at least 30 minutes per day x 5 days per week.  - Follow up in 6 months.   - HEMOGLOBIN A1C; Future    6. Pure hypercholesterolemia  Chronic, low 10-year CV risk, recommended dietary and lifestyle modification.  Patient is referred to help improvement program for weight loss.  - Lipid Profile; Future    7. Thickened endometrium  8. Postmenopausal bleeding  History of postmenopausal bleeding.  Pelvic ultrasound done in May 2018 was notable for thickened endometrium.  I personally discussed the findings with patient.  She was referred to OB/GYN last year, but did not schedule the appointment.  Today, patient states that vaginal bleeding has stopped completely.  She is interested in consultation with OB/GYN.  - REFERRAL TO GYNECOLOGY    9. Lumbosacral strain, initial encounter, L   History and exams are consistent with lumbosacral strain on the left.  -Ice/heat, activity modification, over-the-counter analgesics PRN for pain  -Home rehab exercises provided.    10. PE (physical exam), annual  - pt is counseled on diet, exercise, vitamin supplement, mental health, sleep, stress  - discussed screening guidelines for pap, STD, mammogram, colonoscopy and vaccine recommendations.    - CBC WITH DIFFERENTIAL; Future  - Comp Metabolic Panel; Future  - HEMOGLOBIN A1C; Future  - MICROALBUMIN CREAT RATIO URINE; Future  - Lipid Profile; Future     Ly SHAUN Hook M.D.    Follow up: Return for As needed.    Please note that this dictation was created using voice  recognition software and/or scribes. I have made every reasonable attempt to correct obvious errors, but I expect that there are errors of grammar and possibly content that I did not discover before finalizing the note.     I, Ignacia Crow (Scribe), am scribing for, and in the presence of, Ashley Hook M.D.    Electronically signed by: Ignacia Crow (Scribe), 5/8/2019    Ashley FLORES M.D. personally performed the services described in this documentation, as scribed by Ignacia Crow in my presence, and it is both accurate and complete.

## 2019-05-10 ENCOUNTER — HOSPITAL ENCOUNTER (OUTPATIENT)
Dept: LAB | Facility: MEDICAL CENTER | Age: 49
End: 2019-05-10
Attending: FAMILY MEDICINE
Payer: COMMERCIAL

## 2019-05-10 DIAGNOSIS — Z00.00 PE (PHYSICAL EXAM), ANNUAL: ICD-10-CM

## 2019-05-10 DIAGNOSIS — R73.03 PREDIABETES: ICD-10-CM

## 2019-05-10 DIAGNOSIS — E78.00 PURE HYPERCHOLESTEROLEMIA: ICD-10-CM

## 2019-05-10 DIAGNOSIS — K75.81 NASH (NONALCOHOLIC STEATOHEPATITIS): ICD-10-CM

## 2019-05-10 LAB
ALBUMIN SERPL BCP-MCNC: 4.6 G/DL (ref 3.2–4.9)
ALBUMIN/GLOB SERPL: 1.6 G/DL
ALP SERPL-CCNC: 77 U/L (ref 30–99)
ALT SERPL-CCNC: 87 U/L (ref 2–50)
ANION GAP SERPL CALC-SCNC: 8 MMOL/L (ref 0–11.9)
AST SERPL-CCNC: 46 U/L (ref 12–45)
BASOPHILS # BLD AUTO: 1.2 % (ref 0–1.8)
BASOPHILS # BLD: 0.09 K/UL (ref 0–0.12)
BILIRUB SERPL-MCNC: 0.7 MG/DL (ref 0.1–1.5)
BUN SERPL-MCNC: 15 MG/DL (ref 8–22)
CALCIUM SERPL-MCNC: 9.4 MG/DL (ref 8.5–10.5)
CHLORIDE SERPL-SCNC: 106 MMOL/L (ref 96–112)
CHOLEST SERPL-MCNC: 189 MG/DL (ref 100–199)
CO2 SERPL-SCNC: 28 MMOL/L (ref 20–33)
CREAT SERPL-MCNC: 0.77 MG/DL (ref 0.5–1.4)
CREAT UR-MCNC: 224.4 MG/DL
EOSINOPHIL # BLD AUTO: 0.18 K/UL (ref 0–0.51)
EOSINOPHIL NFR BLD: 2.4 % (ref 0–6.9)
ERYTHROCYTE [DISTWIDTH] IN BLOOD BY AUTOMATED COUNT: 44.6 FL (ref 35.9–50)
EST. AVERAGE GLUCOSE BLD GHB EST-MCNC: 148 MG/DL
FASTING STATUS PATIENT QL REPORTED: NORMAL
GLOBULIN SER CALC-MCNC: 2.8 G/DL (ref 1.9–3.5)
GLUCOSE SERPL-MCNC: 116 MG/DL (ref 65–99)
HBA1C MFR BLD: 6.8 % (ref 0–5.6)
HBV CORE AB SERPL QL IA: NEGATIVE
HBV CORE IGM SER QL: NEGATIVE
HBV SURFACE AB SERPL IA-ACNC: 4.68 MIU/ML (ref 0–10)
HBV SURFACE AG SER QL: NEGATIVE
HCT VFR BLD AUTO: 47.1 % (ref 37–47)
HCV AB SER QL: NEGATIVE
HDLC SERPL-MCNC: 38 MG/DL
HGB BLD-MCNC: 15.5 G/DL (ref 12–16)
IMM GRANULOCYTES # BLD AUTO: 0.03 K/UL (ref 0–0.11)
IMM GRANULOCYTES NFR BLD AUTO: 0.4 % (ref 0–0.9)
LDLC SERPL CALC-MCNC: 113 MG/DL
LYMPHOCYTES # BLD AUTO: 2.1 K/UL (ref 1–4.8)
LYMPHOCYTES NFR BLD: 28.5 % (ref 22–41)
MCH RBC QN AUTO: 30.9 PG (ref 27–33)
MCHC RBC AUTO-ENTMCNC: 32.9 G/DL (ref 33.6–35)
MCV RBC AUTO: 93.8 FL (ref 81.4–97.8)
MICROALBUMIN UR-MCNC: 1.3 MG/DL
MICROALBUMIN/CREAT UR: 6 MG/G (ref 0–30)
MONOCYTES # BLD AUTO: 0.53 K/UL (ref 0–0.85)
MONOCYTES NFR BLD AUTO: 7.2 % (ref 0–13.4)
NEUTROPHILS # BLD AUTO: 4.45 K/UL (ref 2–7.15)
NEUTROPHILS NFR BLD: 60.3 % (ref 44–72)
NRBC # BLD AUTO: 0 K/UL
NRBC BLD-RTO: 0 /100 WBC
PLATELET # BLD AUTO: 257 K/UL (ref 164–446)
PMV BLD AUTO: 10.9 FL (ref 9–12.9)
POTASSIUM SERPL-SCNC: 3.8 MMOL/L (ref 3.6–5.5)
PROT SERPL-MCNC: 7.4 G/DL (ref 6–8.2)
RBC # BLD AUTO: 5.02 M/UL (ref 4.2–5.4)
SODIUM SERPL-SCNC: 142 MMOL/L (ref 135–145)
TRIGL SERPL-MCNC: 191 MG/DL (ref 0–149)
WBC # BLD AUTO: 7.4 K/UL (ref 4.8–10.8)

## 2019-05-10 PROCEDURE — 80053 COMPREHEN METABOLIC PANEL: CPT

## 2019-05-10 PROCEDURE — 83036 HEMOGLOBIN GLYCOSYLATED A1C: CPT

## 2019-05-10 PROCEDURE — 82570 ASSAY OF URINE CREATININE: CPT

## 2019-05-10 PROCEDURE — 80061 LIPID PANEL: CPT

## 2019-05-10 PROCEDURE — 82043 UR ALBUMIN QUANTITATIVE: CPT

## 2019-05-10 PROCEDURE — 87340 HEPATITIS B SURFACE AG IA: CPT

## 2019-05-10 PROCEDURE — 36415 COLL VENOUS BLD VENIPUNCTURE: CPT

## 2019-05-10 PROCEDURE — 86705 HEP B CORE ANTIBODY IGM: CPT

## 2019-05-10 PROCEDURE — 86803 HEPATITIS C AB TEST: CPT

## 2019-05-10 PROCEDURE — 86706 HEP B SURFACE ANTIBODY: CPT

## 2019-05-10 PROCEDURE — 85025 COMPLETE CBC W/AUTO DIFF WBC: CPT

## 2019-05-10 PROCEDURE — 86704 HEP B CORE ANTIBODY TOTAL: CPT

## 2019-06-07 ENCOUNTER — OFFICE VISIT (OUTPATIENT)
Dept: MEDICAL GROUP | Age: 49
End: 2019-06-07
Payer: COMMERCIAL

## 2019-06-07 ENCOUNTER — HOSPITAL ENCOUNTER (OUTPATIENT)
Facility: MEDICAL CENTER | Age: 49
End: 2019-06-07
Attending: FAMILY MEDICINE
Payer: COMMERCIAL

## 2019-06-07 VITALS
BODY MASS INDEX: 35.12 KG/M2 | HEIGHT: 61 IN | OXYGEN SATURATION: 93 % | HEART RATE: 77 BPM | WEIGHT: 186 LBS | SYSTOLIC BLOOD PRESSURE: 116 MMHG | DIASTOLIC BLOOD PRESSURE: 72 MMHG | TEMPERATURE: 97.8 F

## 2019-06-07 DIAGNOSIS — Z11.51 SPECIAL SCREENING EXAMINATION FOR HUMAN PAPILLOMAVIRUS (HPV): ICD-10-CM

## 2019-06-07 DIAGNOSIS — Z01.419 PAP SMEAR, LOW-RISK: ICD-10-CM

## 2019-06-07 DIAGNOSIS — G47.33 OSA (OBSTRUCTIVE SLEEP APNEA): ICD-10-CM

## 2019-06-07 DIAGNOSIS — E66.9 OBESITY (BMI 30-39.9): ICD-10-CM

## 2019-06-07 DIAGNOSIS — E11.9 TYPE 2 DIABETES MELLITUS WITHOUT COMPLICATION, WITHOUT LONG-TERM CURRENT USE OF INSULIN (HCC): ICD-10-CM

## 2019-06-07 DIAGNOSIS — K75.81 NASH (NONALCOHOLIC STEATOHEPATITIS): ICD-10-CM

## 2019-06-07 DIAGNOSIS — L91.8 SKIN TAG: ICD-10-CM

## 2019-06-07 DIAGNOSIS — Z12.4 ROUTINE CERVICAL SMEAR: ICD-10-CM

## 2019-06-07 DIAGNOSIS — R93.89 THICKENED ENDOMETRIUM: ICD-10-CM

## 2019-06-07 DIAGNOSIS — G56.02 CARPAL TUNNEL SYNDROME OF LEFT WRIST: ICD-10-CM

## 2019-06-07 DIAGNOSIS — Z01.419 WELL WOMAN EXAM: ICD-10-CM

## 2019-06-07 DIAGNOSIS — Z12.31 ENCOUNTER FOR SCREENING MAMMOGRAM FOR BREAST CANCER: ICD-10-CM

## 2019-06-07 PROBLEM — R73.03 PREDIABETES: Status: RESOLVED | Noted: 2018-05-25 | Resolved: 2019-06-07

## 2019-06-07 PROCEDURE — 99000 SPECIMEN HANDLING OFFICE-LAB: CPT | Performed by: FAMILY MEDICINE

## 2019-06-07 PROCEDURE — 99214 OFFICE O/P EST MOD 30 MIN: CPT | Mod: 25 | Performed by: FAMILY MEDICINE

## 2019-06-07 PROCEDURE — 99396 PREV VISIT EST AGE 40-64: CPT | Mod: 25 | Performed by: FAMILY MEDICINE

## 2019-06-07 PROCEDURE — 88142 CYTOPATH C/V THIN LAYER: CPT

## 2019-06-07 PROCEDURE — 11200 RMVL SKIN TAGS UP TO&INC 15: CPT | Performed by: FAMILY MEDICINE

## 2019-06-07 NOTE — PROGRESS NOTES
Subjective:   CC: Annual wellness visit, Pap    HPI:     Didi Ontiveros is a 49 y.o. female who is an established patient of the clinic, presents with the following concerns:     She is , and sexually with  partner.   Contraception: condom  Hx of STD: No  Hx of abnormal pap: None  No LMP recorded. Patient is perimenopausal.   Denies fever, chills, pelvic pain, dyspareunia, abnormal vaginal bleeding/discharge, genital rash.   Denies nipple bleeding, discharge, breast mass, familial/personal hx of breast/gyn malignancy.   Last mammogram: 2018, Finding: There are scattered areas of fibroglandular density. There is no dominant mass, suspicious calcification, or any secondary malignant sign.  A few scattered benign-appearing calcifications are once again noted.    She was previously diagnosed with postmenopausal vaginal bleeding.  Pelvic ultrasound done in May 2018 was notable for thickened endometrium.  Patient was referred to OB/GYN for endometrial biopsy, but she has not scheduled it.  Since then, patient has not had any more vaginal bleeding.    Patient had blood tests done on 5/10, and would like to discuss results. She has prior history of prediabetes. The patient has been attempting to exercise for the past year, and reports a weight loss of 8 lbs but notes her weight often fluctuates. She states she has been eating salads, as well as fruits up to three times/week. Patient reports a family history of Type II diabetes mellitus. She has an appointment with weight management soon.     The patient was previously diagnosed with carpal tunnel syndrome of the left wrist, and wears a splint nightly. She also exercises with weights and reports mild pain, however this is improved.     Patient has a history of HART and obesity, and is attempting to modify diet and exercise.     Patient displayed symptoms of obstructive sleep apnea.  She had overnight oximetry study done in 2018, which was concerning for  "obstructive sleep apnea.  She was referred to sleep medicine, but patient has not scheduled.  She continues to complains of chronic fatigue, weight gain, feeling unrefreshed in the morning, daytime hypersomnolence.    Patient complains of 2 tender skin tags on inner thigh bilaterally.  Due to body habitus, the skin tag rubs against clothing leading to skin irritation and discomfort.  Patient would like to have the skin tags removed.    Health maintenance reviewed. Patient is due for a mammogram.     Current medicines (including changes today)  Current Outpatient Prescriptions   Medication Sig Dispense Refill   • Omega-3 Fatty Acids (FISH OIL) 1000 MG CAPSULE DELAYED RELEASE EC softgel capsule Take 1 Cap by mouth 2 Times a Day. 180 Cap 1   • naproxen (NAPROSYN) 500 MG Tab Take 1 Tab by mouth 2 times a day, with meals. PRN (Patient not taking: Reported on 6/7/2019) 30 Tab 0   • methocarbamol (ROBAXIN) 500 MG Tab 1-2 tablets at bedtime prn, no driving (Patient not taking: Reported on 6/7/2019) 30 Tab 0     No current facility-administered medications for this visit.      She  has no past medical history on file.    I personally reviewed patient's problem list, allergies, medications, family hx, social hx with patient and update EPIC.     REVIEW OF SYSTEMS:  CONSTITUTIONAL:  Denies night sweats, malaise, lethargy, fever or chills.  RESPIRATORY:  Denies cough, wheeze, hemoptysis, or shortness of breath.  CARDIOVASCULAR:  Denies chest pains, palpitations, pedal edema  : Denies pelvic pain, dyspareunia, abnormal vaginal bleeding/discharge, or genital rash.      Objective:     /72 (BP Location: Right arm, Patient Position: Sitting, BP Cuff Size: Adult)   Pulse 77   Temp 36.6 °C (97.8 °F) (Temporal)   Ht 1.549 m (5' 1\")   Wt 84.4 kg (186 lb)   SpO2 93%  Body mass index is 35.14 kg/m².    Physical Exam:  Constitutional: awake, alert, in no distress.  Skin: Warm, dry, good turgor, no rashes, bruises, ulcers in " visible areas.  -To large, tender, mildly erythematous skin tags noted at inner thighs bilaterally.  Neck: Trachea midline, no masses, no thyromegaly. No cervical or supraclavicular lymphadenopathy  Respiratory: Unlabored respiratory effort, lungs clear to auscultation, no wheezes, no rales.  Cardiovascular: Normal S1, S2, no murmur, no pedal edema.   Psych: Oriented x3, affect and mood wnl, intact judgement and insight.     Pelvic: Unremarkable external genitalia. Negative abnormal vaginal discharge or bleeding, no vaginal rash, cervix in mid position, no concerning lesions on cervix, no cervical motion tenderness, uterus mid position with normal size & shape, no adnexal fullness/mass appreciated on bimanual exam.      Assessment and Plan:   The following treatment plan was discussed    1. Encounter for screening mammogram for breast cancer  - MA-SCREEN MAMMO W/CAD-BILAT; Future    2. Pap smear, low-risk  - THINPREP PAP WITH HPV; Future    3. Routine cervical smear  - THINPREP PAP WITH HPV; Future    4. Special screening examination for human papillomavirus (HPV)  - THINPREP PAP WITH HPV; Future    5. Thickened endometrium  Patient had a history of postmenopausal bleeding, vaginal ultrasound was notable for thickened endometrium.  Patient was referred to OB/GYN for biopsy last year and again this year, but she has not scheduled.  Today, patient reports that vaginal bleeding has stopped.  She denies any systemic or pelvic symptoms.  -Encouraged patient to schedule appointment with OB/GYN for endometrial biopsy.  Contact information provided.    6. Type 2 diabetes mellitus without complication, without long-term current use of insulin (HCC)  New diagnosis, recent blood test was notable for A1c of 6.8.  Patient is asymptomatic.  Positive familial history of type 2 diabetes in first-degree family members.  I discussed pathogenesis with patient.  Recommended a trial of dietary modification, exercise, weight loss to  control blood sugar. Plans:  - REFERRAL TO DIABETIC EDUCATION Diabetes Self Management Education / Training (DSME/T) and Medical Nutrition Therapy (MNT): Initial Group DSME/MNT as authorized by payor, Follow-Up DSME/MNT as authorized by payor; DSME/T Content: Physical Activity,...  - HEMOGLOBIN A1C; Future  - Discussed dietary modification, exercise, weight loss  - Annual eye exam  - Regular foot exam  - Discussed vaccines recommendations: PPSV 23 and hepatitis B.   - Side effects of all medications discussed with patient  - Follow up in 3 months.     7. Carpal tunnel syndrome of left wrist  Improving with conservative management, will continue to monitor.    8. HART (nonalcoholic steatohepatitis)  Recommended weight loss and vitamin D supplement, will continue to monitor    9. ROMEO (obstructive sleep apnea)  History is concerning for sleep apnea.  Patient has positive overnight oximetry study last year.  She was referred to sleep medicine, but has not scheduled it.  Patient is interested in evaluation for sleep apnea today.  Plans:  - REFERRAL TO SLEEP STUDIES    10. Obesity (BMI 30-39.9)  - Patient identified as having weight management issue.  Appropriate orders and counseling given.     11. Skin tag  Patient complains of 2 tender, inflamed, irritated skin tags at inner thigh leading to skin irritation and pain when the skin tags rubs against clothing.  - removal    12. WWE:   General counseling provided, topics might include: diet, exercise, vitamin supplement, mental health, sleep, stress management, pap, mammogram, colonoscopy and vaccine recommendations.        Skin Tag Removal Procedure Note    Pre-procedure diagnosis: Inflamed skin tag  Post-procedure diagnosis: same  Site: Inner thigh bilaterally    PARQ: PARQ conference held. Written consent obtained.    Procedure: Site was cleansed with alcohol. Local anesthesia was achieved using 1% lidocaine with epinephrine.  2 skin tags were removed with with scissor.   Minor bleeding noted, controlled with direct pressure. The wounds were covered with bandaids. Patient tolerated the procedure well with no complications.    Specimen: biopsy specimen was not sent to pathology.    EBL: none    Tolerated: well    Ashley Hook M.D.    Followup: Return in about 3 months (around 9/7/2019) for Diabetes.    Please note that this dictation was created using voice recognition software and/or scribes. I have made every reasonable attempt to correct obvious errors, but I expect that there are errors of grammar and possibly content that I did not discover before finalizing the note.     Ana FLORES (Chris), am scribing for, and in the presence of, Ashley Hook M.D.    Electronically signed by: Ana Cavanaugh (Chris), 6/7/2019    Ashley FLORES M.D. personally performed the services described in this documentation, as scribed by Ana Cavanaugh in my presence, and it is both accurate and complete.

## 2019-06-07 NOTE — PATIENT INSTRUCTIONS
[facility]  [address]  [place] [address]  [phone]

## 2019-06-08 DIAGNOSIS — Z11.51 SPECIAL SCREENING EXAMINATION FOR HUMAN PAPILLOMAVIRUS (HPV): ICD-10-CM

## 2019-06-08 DIAGNOSIS — Z12.4 ROUTINE CERVICAL SMEAR: ICD-10-CM

## 2019-06-08 DIAGNOSIS — Z01.419 PAP SMEAR, LOW-RISK: ICD-10-CM

## 2019-06-10 ENCOUNTER — OFFICE VISIT (OUTPATIENT)
Dept: HEALTH INFORMATION MANAGEMENT | Facility: MEDICAL CENTER | Age: 49
End: 2019-06-10
Payer: COMMERCIAL

## 2019-06-10 VITALS
DIASTOLIC BLOOD PRESSURE: 70 MMHG | BODY MASS INDEX: 34.98 KG/M2 | HEIGHT: 61 IN | WEIGHT: 185.3 LBS | HEART RATE: 80 BPM | SYSTOLIC BLOOD PRESSURE: 118 MMHG | OXYGEN SATURATION: 95 %

## 2019-06-10 DIAGNOSIS — L83 AN (ACANTHOSIS NIGRICANS): ICD-10-CM

## 2019-06-10 DIAGNOSIS — E11.9 TYPE 2 DIABETES MELLITUS WITHOUT COMPLICATION, WITHOUT LONG-TERM CURRENT USE OF INSULIN (HCC): ICD-10-CM

## 2019-06-10 DIAGNOSIS — R63.5 WEIGHT GAIN: ICD-10-CM

## 2019-06-10 DIAGNOSIS — E66.9 OBESITY (BMI 30-39.9): ICD-10-CM

## 2019-06-10 DIAGNOSIS — L91.8 ACROCHORDON: ICD-10-CM

## 2019-06-10 DIAGNOSIS — R53.82 CHRONIC FATIGUE: ICD-10-CM

## 2019-06-10 DIAGNOSIS — K75.81 NASH (NONALCOHOLIC STEATOHEPATITIS): ICD-10-CM

## 2019-06-10 DIAGNOSIS — E78.2 MIXED HYPERLIPIDEMIA: ICD-10-CM

## 2019-06-10 DIAGNOSIS — G47.33 OSA (OBSTRUCTIVE SLEEP APNEA): ICD-10-CM

## 2019-06-10 DIAGNOSIS — E78.1 HYPERTRIGLYCERIDEMIA: ICD-10-CM

## 2019-06-10 PROCEDURE — 93000 ELECTROCARDIOGRAM COMPLETE: CPT | Performed by: INTERNAL MEDICINE

## 2019-06-10 PROCEDURE — 99205 OFFICE O/P NEW HI 60 MIN: CPT | Mod: 25 | Performed by: INTERNAL MEDICINE

## 2019-06-10 PROCEDURE — 97802 MEDICAL NUTRITION INDIV IN: CPT | Performed by: DIETITIAN, REGISTERED

## 2019-06-10 RX ORDER — METFORMIN HYDROCHLORIDE 500 MG/1
500 TABLET, EXTENDED RELEASE ORAL DAILY
Qty: 30 TAB | Refills: 1 | Status: SHIPPED | OUTPATIENT
Start: 2019-06-10 | End: 2019-08-05 | Stop reason: SDUPTHER

## 2019-06-10 NOTE — PROGRESS NOTES
"6/10/2019   Referring Provider: Ashley Hook M.D.       Time in/out:  11:30-12:05    Anthropometrics/Objective  Vitals:    06/10/19 1050   BP: 118/70   Weight: 84.1 kg (185 lb 4.8 oz)   Height: 1.549 m (5' 1\")     BMI: Body mass index is 35.01 kg/m².  Stated Goal Weight:  -50lb   See comprehensive patient history form for further information     Subjective:  Pt is here today for the initial screening visit for the medical weight management program.   -Pt states time is her biggest challenge to eating healthy   -Cereal for breakfast when she gets to work at 7:30am   -Often skips lunch bc she is busy at work   -Cooks dinner with her family. Makes enough for left overs   -Rarely snacks   -Newly dx'd with Type 2 DM   -Drinks water and lemonade . Coffee with creamer   -goes to the gym 3-4 x per week   See Medical Questionnaire for more detailed diet history     Nutrition Diagnosis (PES Statement)  · Obesity related to excessive energy intake and inadequate energy expenditure as evidenced by BMI >30     Client history:  Condition(s) associated with a diagnosis or treatment (specify) Type 2 DM , HART, high TG     Biochemical data, medical test and procedures  Lab Results   Component Value Date/Time    HBA1C 6.8 (H) 05/10/2019 06:13 AM   @  No results found for: POCGLUCOSE  Lab Results   Component Value Date/Time    CHOLSTRLTOT 189 05/10/2019 06:13 AM     (H) 05/10/2019 06:13 AM    HDL 38 (A) 05/10/2019 06:13 AM    TRIGLYCERIDE 191 (H) 05/10/2019 06:13 AM         Nutrition Intervention  Nutrition Prescription  Recommended Daily Kcals: 3155-8507 Kcal based on REE of 1528   Recommended Daily Protein: 100g      Meal Plan Recommendation   High Protein, Low carb diet with 0-1 meal replacements per day     Comprehensive Nutrition Education Instruction or training leading to in-depth nutrition related knowledge about:  Benefits to following meal plan, Combine carb, protein and fat at each meal, Meal timing and spacing, " Portion control, Sweets and alcohol in moderation, and Meal ideas    Handouts provided regarding topics discussed     Monitoring & Evaluation Plan    Behavioral-Environmental:  Behavior: Keep a food journal and bring to next appointment   Physical activity: gym 3-4x per week     Food / Nutrient Intake:  Food intake: Follow meal plan as discussed. Avoid concentrated sweets and processed carbs. Use the plate method for portion control and macronutrient balance. Limit carbs/starch to up to 1/2 cup per meal. Snacks should be 1oz protein + ns veggies.     Fluid intake: Consume at least 64 oz water per day. Avoid all sweetened beverages. Limit coffee to 1 cup a day (ideally no cream or sugar). Avoid alcohol.     Physical Signs / Symptoms:  Weight change : -1-2lb per week       Assessment Notes:  Today I oriented Didi to Dr. Zamora's Medical Weight Management program. Encouraged a higher protein, lower carbohydrate, and calorie controlled meal plan consisting of 3 meals and 2 snacks per day with optional use of meal replacements. Encouraged patient to track their food/beverage intake on My fitness pal.  We discussed how to build protein into each meal and snack, incorporating 1/2 plate non-starchy vegetable twice daily, optional 1/2 cup of complex carbohydrate at meals if desired, avoiding simple sugars. Reviewed snack guidelines to include 1oz protein and optional non-starchy vegetable or 1 serving of fruit.     She will be aiming for <1500 kcal/d.  She will also be looking at the macronutrient feature and aiming for 100g or less of carbohydrate and 100g or more of protein per day per Dr. Zamora recommendations.  Next visit we will review patients food journal and set more specific macronutrient goals and/or adjust calorie goal if indicated. Also, if time permits will review nutrition basics for carbs, proteins and fat to encourage more nutritious foods.        Follow up 2 weeks with FRANTZ; 4-6weeks with MD

## 2019-06-10 NOTE — PROGRESS NOTES
"Bariatric Medicine H&P  Chief Complaint   Patient presents with   • Weight Gain       Referred by:  Ashley Hook M.D.    History of Present Illness:   Didi Ontiveros is a 49 y.o.  female who presents for weight management with her son and to help address co-morbidities caused by overweight, as below.    The patient would like to improve her health.  She is concerned about her recent diagnosis of T2 DM.  She has not been in a formal weight loss program in the past, has not been on anti-obesity medication.  She is not sure what works best for her for weight loss.    Brief Diet History (see also RD notes):  AM: Cereal, coffee  Lunch: Meat, pasta, vegetable soup (often skips)  Dinner: chicken, starches vegetables  Snacks: \"not really\"  Drinks: Flavored water    T2DM: New diagnosis.  Has not been on metformin or other glucose lowering agent  Segura: Not drinking alcohol she confirms, has not seen gastroenterology  HLD: Not on statin or fenofibrate    Behavior-Related History:  Binge eating screen: Negative  H/o abuse: Negative     Exercise:   Cardio/weights 2-3 times per week     Review of Systems   Positive for fatigue, lack of energy, absent menses, headaches, joint pain  Sleep apnea screen: Negative, sleep eval pending for ROMEO  All other ROS were reviewed with patient today and are negative.      PMH/PSH:  I have reviewed the patient's medical, social and family history, allergies, and medications today.  Prior records reviewed.  Personal Hx of Bariatric Surgery: Negative      Physical Exam:   /70 (BP Location: Left arm, Patient Position: Sitting, BP Cuff Size: Large adult)   Pulse 80   Ht 1.549 m (5' 1\")   Wt 84.1 kg (185 lb 4.8 oz)   SpO2 95%   BMI 35.01 kg/m²   Waist Measurement   Waist: 42 inch/inches  Body fat % 48  REE 1528 kcal/day    Constitutional: Oriented to person, place, and time and well-developed, well-nourished, and in no distress.    HENT: No facial plethora.  No " Cushingoid features.  No scalloped tongue.  No dental erosions.  No swollen parotids.  Head: Normocephalic.   Eyes: EOM are normal. Pupils are equal, round, and reactive to light. No periorbital edema.  No lateral thinning of eyebrows.  No vertical nystagmus.  Neck: Normal range of motion. Neck supple. No thyromegaly present. No buffalo hump.  Cardiovascular: Normal rate and regular rhythm.  No murmur heard.  Pulmonary/Chest: Effort normal and breath sounds normal. No wheezes.   Abdominal: Soft. Bowel sounds are normal. Grade 1 pannus.  No ascites.  No hepatosplenomegaly.   Musculoskeletal: Normal range of motion. No edema.   Neurological: Alert and oriented to person, place, and time. Normal reflexes. No cranial nerve deficit. No muscle weakness.  Gait normal.   Skin: Warm and dry. Not diaphoretic. No hirsuitism.  Acanthosis nigricans.  Not excessively dry, scaly.  No acne.  No bruising/ecchymosis.  No hyperpigmentation.  Numerous neck acrochordon.    Psychiatric: Mood, memory, affect and judgment normal.     Laboratory:   Prior labs reviewed.  EKG: Sinus rhythm, rate 65, early repolarization, corrected QT 0.451  Ordered and reviewed by me today.    Dietitian Assessment: I have reviewed the Dietitian's assessment related to this encounter.       ASSESSMENT/PLAN:  Body mass index is 35.01 kg/m².   Obesity Stage ([facility]) 2; Class 2    1. Type 2 diabetes mellitus without complication, without long-term current use of insulin (HCC)  metFORMIN ER (GLUCOPHAGE XR) 500 MG TABLET SR 24 HR   2. ROMEO (obstructive sleep apnea)     3. Mixed hyperlipidemia     4. HART (nonalcoholic steatohepatitis)     5. Obesity (BMI 30-39.9)     6. Hypertriglyceridemia     7. Weight gain     8. Chronic fatigue  EKG   9. Acrochordon     10. AN (acanthosis nigricans)       The patient's new T2DM diagnosis, along with fatty liver, elevated triglycerides, chronic fatigue are concerning for long-standing impaired fasting glucose, insulin  resistance.  Will need to significantly reduce refined carbohydrate intake permanently, to reverse above.  Start tracking intake work on significant CHO reduction, work towards weight loss goals.  Avoid skipping meals, start stimulus narrowing if needed.  Start metformin given above, consider also anti-obesity medication pending course.  Sleep study pending.  Continue activity as she is doing.    The patient and I have discussed at length and agree to the following recommendations, which are all addressing the above diagnoses:    Weight Goal: 5% wt loss at one month after start (pt goal weight is -50 lb)  Diet:     MR: Strongly consider 1 Robard meal replacement per day instead of skipping lunch  High Protein/Low Carb Meals and 2 snacks between meals daily  >100 g protein, <100 g total carbs daily, less than 1500 kcal per day to start  64+ oz water per day  Avoid skipping lunch  Track daily intake with My Fitness Pal, bring to next visit  Physical Activity: 3 to 4 days/week cardio, weights at gym with her son  Risk level for moderate/vigorous exercise program:   Low  New Rx:   Metformin 500 XL, titrate up as tolerated  Behavior change:   Tracking, more mindful food choices and planning  Follow-up: one month with MD, 2 wks with RD    Face to face time spent 60 minutes,  with >50% of time devoted to one on one counseling on weight management issues, as documented above.      Patient's body mass index is 35.01 kg/m². Exercise and nutrition counseling were performed at this visit.        Thank you for your referral!

## 2019-06-12 NOTE — TELEPHONE ENCOUNTER
Done.  
Please start application for overnight oxymetry study for patient. Thanks  Ashley Hook M.D.    
d/w pt,  and daughter at bedside

## 2019-06-15 ENCOUNTER — HOSPITAL ENCOUNTER (OUTPATIENT)
Dept: RADIOLOGY | Facility: MEDICAL CENTER | Age: 49
End: 2019-06-15
Attending: FAMILY MEDICINE
Payer: COMMERCIAL

## 2019-06-15 DIAGNOSIS — Z12.31 VISIT FOR SCREENING MAMMOGRAM: ICD-10-CM

## 2019-06-15 PROCEDURE — 77063 BREAST TOMOSYNTHESIS BI: CPT

## 2019-06-18 LAB — TEST NAME 95000: NORMAL

## 2019-06-25 ENCOUNTER — NON-PROVIDER VISIT (OUTPATIENT)
Dept: HEALTH INFORMATION MANAGEMENT | Facility: MEDICAL CENTER | Age: 49
End: 2019-06-25
Payer: COMMERCIAL

## 2019-06-25 VITALS — BODY MASS INDEX: 33.79 KG/M2 | WEIGHT: 179 LBS | HEIGHT: 61 IN

## 2019-06-25 DIAGNOSIS — E66.9 OBESITY (BMI 30-39.9): ICD-10-CM

## 2019-06-25 PROCEDURE — 97803 MED NUTRITION INDIV SUBSEQ: CPT | Performed by: DIETITIAN, REGISTERED

## 2019-06-25 NOTE — PROGRESS NOTES
"Nutrition Reassess: Medical Weight Management   Today's date: 6/25/2019  Referring Provider: Ashley Hook MD      Time: in/out  3:17-3:50pm   Visit#: 2    Subjective:  -Pt states she has been drinking more water   -Going to gym 3-4 x per week   -Stopped adding cream and sugar in coffee   -Stopped lemonade or sweetened beverages. Has 1 sparkling water SF per day in afternoon to help curb sugar cravings   -Is no longer skip meals; or very rarely skips a meal   -Is eating more vegetables and less carbohydrate    -Trying to use my fitness pal but the information is not being saved in the chen     Diet history:   Breakfast - 1 slice whole grain bread, egg, ham, black coffee   Snack - protein bar from [employer]   Lunch - salad with egg or chx ; had 6\" sub sandwich from tray at work today   Snack - ICE sparkling water drink   Dinner - salad or veggies with chicken or pork or vegetable soup     Anthropometrics/Objective  Today's weight:    Vitals:    06/25/19 1551   Weight: 81.2 kg (179 lb)   Height: 1.549 m (5' 1\")     BMI:  Body mass index is 33.82 kg/m².  Starting weight/date 185     Total weight change : -6lb            Meal Plan:   <1500calorie, high protein, carb controlled diet  with 0-meal replacements per day     ReAssesment/Notes:  Didi has made some great changes to her diet and exercise plan. Today we reviewed diet hx and looked at my fitness pal food journal chen. Showed pt how to correctly use the chen; ie how to save info correctly and how to adjust serving and portion size.   No new information discussed today. She will be attending the Type 2 DM class for additional nutrition education on various topics including label reading, carb counting, fiber, and heart healthy diet.      Follow-up: 5 weeks; seeing Dr. HORTON in 4 weeks   Taking Type 2 DM class next month    "

## 2019-07-02 ENCOUNTER — APPOINTMENT (OUTPATIENT)
Dept: HEALTH INFORMATION MANAGEMENT | Facility: MEDICAL CENTER | Age: 49
End: 2019-07-02
Payer: COMMERCIAL

## 2019-08-01 DIAGNOSIS — E11.9 TYPE 2 DIABETES MELLITUS WITHOUT COMPLICATION, WITHOUT LONG-TERM CURRENT USE OF INSULIN (HCC): ICD-10-CM

## 2019-08-01 NOTE — TELEPHONE ENCOUNTER
Was the patient seen in the last year in this department? Yes LOV: 6/10/19 FV: 8/13/19 (RD Only)     Does patient have an active prescription for medications requested? Yes    Received Request Via: Pharmacy     **90 Day Supply Requested**

## 2019-08-05 RX ORDER — METFORMIN HYDROCHLORIDE 500 MG/1
500 TABLET, EXTENDED RELEASE ORAL DAILY
Qty: 30 TAB | Refills: 1 | Status: SHIPPED | OUTPATIENT
Start: 2019-08-05 | End: 2019-09-23 | Stop reason: SDUPTHER

## 2019-08-27 DIAGNOSIS — E11.9 TYPE 2 DIABETES MELLITUS WITHOUT COMPLICATION, WITHOUT LONG-TERM CURRENT USE OF INSULIN (HCC): ICD-10-CM

## 2019-08-27 RX ORDER — METFORMIN HYDROCHLORIDE 500 MG/1
500 TABLET, EXTENDED RELEASE ORAL DAILY
Qty: 90 TAB | Refills: 0 | OUTPATIENT
Start: 2019-08-27 | End: 2019-11-25

## 2019-08-27 NOTE — TELEPHONE ENCOUNTER
Was the patient seen in the last year in this department? Yes    Does patient have an active prescription for medications requested? Yes    Received Request Via: Pharmacy Requesting 90 day supply

## 2019-09-20 DIAGNOSIS — E11.9 TYPE 2 DIABETES MELLITUS WITHOUT COMPLICATION, WITHOUT LONG-TERM CURRENT USE OF INSULIN (HCC): ICD-10-CM

## 2019-09-20 NOTE — TELEPHONE ENCOUNTER
Was the patient seen in the last year in this department? Yes LOV 6/10/19  FV none    Does patient have an active prescription for medications requested? Yes    Received Request Via: Pharmacy

## 2019-09-23 RX ORDER — METFORMIN HYDROCHLORIDE 500 MG/1
500 TABLET, EXTENDED RELEASE ORAL DAILY
Qty: 30 TAB | Refills: 1 | Status: SHIPPED | OUTPATIENT
Start: 2019-09-23 | End: 2020-01-10 | Stop reason: SDUPTHER

## 2019-12-27 NOTE — PROGRESS NOTES
Subjective:   CC: lab review    HPI:     Didi Ontiveros is a 48 y.o. female, established patient of the clinic, presents with the following concerns:     1. Carpal tunnel syndrome of left wrist  Patient was diagnosed with carpal tunnel syndrome of the left wrist during previous office visit. Patient has been using night splints and exercises on her left breast regularly. Her symptoms are improving moderately. Patient was not able to  diclofenac gel due to poor insurance coverage.    2. HART (nonalcoholic steatohepatitis)  Chronic elevated LFT, ultrasound done in 2014 was consistent with fatty infiltration of the liver. Patient had not had hepatitis B or hepatitis C screen. She denies history of drugs, alcohol abuse, history of tattoo, hx of blood transfusion, personal or familial history of hepatobiliary diseases.    3. Prediabetes  Recent blood test was notable for elevated A1c, 6.2. Patient otherwise asymptomatic. Positive family history of type 2 diabetes in her sisters. Patient endorses consumption of foods and drinks with high glycemic index regularly.    4. Obesity (BMI 30-39.9)  Patient is obese, working on dietary modification and exercises regularly. However, patient is having a hard time losing weight. Patient was recently diagnosed with possible sleep apnea. She is awaiting appointment with sleep medicine. Patient is interested in referral to [facility] improvement program for weight loss.    5. ROMEO (obstructive sleep apnea)  Patient was seen by me on January 29, 2018. At that time, she complains of abnormal weight gain, forgetfulness,, poor concentration, excessive sleepiness during the day, and feeling unrefreshed in the morning after 6-8 hours of sleep. Overnight oximetry was concerning for possible sleep apnea. Patient was referred to sleep medicine but has not scheduled.    6. Thickened endometrium  Patient had pelvic ultrasound done in May 2018 due to dyspareunia. She was found to have  "thickened endometrial layer concerning for internal blood products, hyperplasia or malignancy. Patient was referred to OB/GYN, but had not scheduled. She patient states that she passed a large clot last week after coughing. She then has couple of days of light vaginal bleeding. She denies pelvic pain, genital rash, abnormal vaginal discharge, personal or familial history of breast gynecological malignancy, unexplained weight loss.     7. Pure hypercholesterolemia  Recent blood test was notable for elevated triglyceride and mild elevated LDL. Pt is working on dietary modification and exercises regularly.     Current medicines (including changes today)  Current Outpatient Prescriptions   Medication Sig Dispense Refill   • Diclofenac Sodium 1 % Gel Apply to 2-4 gram to affected area 4 times daily 100 g 1   • metFORMIN ER (GLUCOPHAGE XR) 500 MG TABLET SR 24 HR Take 1 Tab by mouth 2 times a day. 180 Tab 1   • Omega-3 Fatty Acids (FISH OIL) 1000 MG CAPSULE DELAYED RELEASE EC softgel capsule Take 1 Cap by mouth 2 Times a Day. 180 Cap 1     No current facility-administered medications for this visit.      She  has no past medical history on file.    I personally reviewed patient's problem list, allergies, medications, family hx, social hx with patient and update EPIC.     REVIEW OF SYSTEMS:  CONSTITUTIONAL:  Denies night sweats, fatigue, malaise, lethargy, fever or chills.  RESPIRATORY:  Denies cough, wheeze, hemoptysis, or shortness of breath.  CARDIOVASCULAR:  Denies chest pains, palpitations, pedal edema     Objective:     Blood pressure 120/78, pulse 84, temperature 36.5 °C (97.7 °F), height 1.549 m (5' 1\"), weight 81.2 kg (179 lb), SpO2 94 %. Body mass index is 33.82 kg/m².    Physical Exam:  Constitutional: awake, alert, in no distress, obese.  Skin: Warm, dry, good turgor, no rashes, bruises, ulcers in visible areas.  Neck: Trachea midline, no masses, no thyromegaly. No cervical or supraclavicular " lymphadenopathy  Respiratory: Unlabored respiratory effort, lungs clear to auscultation, no wheezes, no rales.  Cardiovascular: Normal S1, S2, no murmur, no pedal edema.  Abdomen: Soft, non-tender to palpation, active BS, no hernia, no hepatosplenomegaly, negative rebound or guarding.   Psych: Oriented x3, affect and mood wnl, intact judgement and insight.       Assessment and Plan:   The following treatment plan was discussed    1. Carpal tunnel syndrome of left wrist  Chronic, responding well to night splint and wrist exercise. Pt has not picked up Diclofenac gel due to no insurance coverage. Plans:   - Diclofenac Sodium 1 % Gel; Apply to 2-4 gram to affected area 4 times daily  Dispense: 100 g; Refill: 1  - coupon provided to cover the cost of Diclofenac gel  - continue night splint and wrist exercise daily  - activity modification    2. HART (nonalcoholic steatohepatitis)  Persistent elevated LFT, liver US done in 2014 confirms fatty infiltration. Pt continues to have more weight gain over the past few years. Will screen for Hep B/C. Recommended weight loss and dietary modification. Plans:   - COMP METABOLIC PANEL; Future  - HEP B SURFACE ANTIGEN; Future  - HEP B CORE AB TOTAL; Future  - HEP B CORE AB IGM; Future  - HEP B SURFACE AB; Future  - HEP C VIRUS ANTIBODY; Future  - referred to HIP for weight loss.     3. Prediabetes  A1C 6.2, given hx of obesity, will start low dose Metformin. Plans:   - metFORMIN ER (GLUCOPHAGE XR) 500 MG TABLET SR 24 HR; Take 1 Tab by mouth 2 times a day.  Dispense: 180 Tab; Refill: 1  - HEMOGLOBIN A1C; Future  - f/u in 6 months.     4. Obesity (BMI 30-39.9)  - REFERRAL TO [facility] IMPROVEMENT PROGRAMS (HIP) Services Requested: Medical Weight Management Program, Physician Medical Weight Management Program; Reason for Referral? BMI>30; Reason for Visit: Overweight/Obesity    5. ROMEO (obstructive sleep apnea)  - pt is advised to schedule appt with sleep  Medicine. Phone numbers  provided.     6. Thickened endometrium  - pt is advised to schedule appointment with OBGYn, phone numbers provided.     7. Pure hypercholesterolemia  - lifestyle modification, weight loss.   - Omega-3 Fatty Acids (FISH OIL) 1000 MG CAPSULE DELAYED RELEASE EC softgel capsule; Take 1 Cap by mouth 2 Times a Day.  Dispense: 180 Cap; Refill: 1  - LIPID PROFILE; in 6 months.     8. Need for Tdap vaccination  - TDAP VACCINE =>8YO IM      Ashley Hook M.D.      Followup: Return in about 6 months (around 12/4/2018) for Multiple issues, Pap, Long.    Please note that this dictation was created using voice recognition software. I have made every reasonable attempt to correct obvious errors, but I expect that there are errors of grammar and possibly content that I did not discover before finalizing the note.            normal... Well appearing, awake, alert, oriented to person, place, time/situation and in no apparent distress.

## 2020-01-10 ENCOUNTER — OFFICE VISIT (OUTPATIENT)
Dept: MEDICAL GROUP | Age: 50
End: 2020-01-10
Payer: COMMERCIAL

## 2020-01-10 VITALS
BODY MASS INDEX: 35.73 KG/M2 | DIASTOLIC BLOOD PRESSURE: 62 MMHG | TEMPERATURE: 98.7 F | OXYGEN SATURATION: 97 % | WEIGHT: 182 LBS | HEART RATE: 61 BPM | SYSTOLIC BLOOD PRESSURE: 102 MMHG | HEIGHT: 60 IN | RESPIRATION RATE: 16 BRPM

## 2020-01-10 DIAGNOSIS — G56.03 BILATERAL CARPAL TUNNEL SYNDROME: ICD-10-CM

## 2020-01-10 DIAGNOSIS — K75.81 NASH (NONALCOHOLIC STEATOHEPATITIS): ICD-10-CM

## 2020-01-10 DIAGNOSIS — E66.9 OBESITY (BMI 30-39.9): ICD-10-CM

## 2020-01-10 DIAGNOSIS — Z23 NEED FOR VACCINATION: ICD-10-CM

## 2020-01-10 DIAGNOSIS — Z00.00 PE (PHYSICAL EXAM), ANNUAL: Primary | ICD-10-CM

## 2020-01-10 DIAGNOSIS — E78.1 HYPERTRIGLYCERIDEMIA: ICD-10-CM

## 2020-01-10 DIAGNOSIS — E11.9 TYPE 2 DIABETES MELLITUS WITHOUT COMPLICATION, WITHOUT LONG-TERM CURRENT USE OF INSULIN (HCC): ICD-10-CM

## 2020-01-10 DIAGNOSIS — E78.00 PURE HYPERCHOLESTEROLEMIA: ICD-10-CM

## 2020-01-10 DIAGNOSIS — T78.1XXA ALLERGIC REACTION TO FOOD, INITIAL ENCOUNTER: ICD-10-CM

## 2020-01-10 PROBLEM — R53.82 CHRONIC FATIGUE: Status: RESOLVED | Noted: 2019-06-10 | Resolved: 2020-01-10

## 2020-01-10 PROBLEM — L91.8 ACROCHORDON: Status: RESOLVED | Noted: 2019-06-10 | Resolved: 2020-01-10

## 2020-01-10 PROBLEM — R63.5 WEIGHT GAIN: Status: RESOLVED | Noted: 2019-06-10 | Resolved: 2020-01-10

## 2020-01-10 PROBLEM — R93.89 THICKENED ENDOMETRIUM: Status: RESOLVED | Noted: 2018-05-24 | Resolved: 2020-01-10

## 2020-01-10 PROBLEM — L83 AN (ACANTHOSIS NIGRICANS): Status: RESOLVED | Noted: 2019-06-10 | Resolved: 2020-01-10

## 2020-01-10 LAB
HBA1C MFR BLD: 6.2 % (ref 0–5.6)
INT CON NEG: POSITIVE
INT CON POS: NEGATIVE

## 2020-01-10 PROCEDURE — 90746 HEPB VACCINE 3 DOSE ADULT IM: CPT | Performed by: FAMILY MEDICINE

## 2020-01-10 PROCEDURE — 83036 HEMOGLOBIN GLYCOSYLATED A1C: CPT | Performed by: FAMILY MEDICINE

## 2020-01-10 PROCEDURE — 90732 PPSV23 VACC 2 YRS+ SUBQ/IM: CPT | Performed by: FAMILY MEDICINE

## 2020-01-10 PROCEDURE — 90686 IIV4 VACC NO PRSV 0.5 ML IM: CPT | Performed by: FAMILY MEDICINE

## 2020-01-10 PROCEDURE — 99396 PREV VISIT EST AGE 40-64: CPT | Mod: 25 | Performed by: FAMILY MEDICINE

## 2020-01-10 PROCEDURE — 90472 IMMUNIZATION ADMIN EACH ADD: CPT | Performed by: FAMILY MEDICINE

## 2020-01-10 PROCEDURE — 90471 IMMUNIZATION ADMIN: CPT | Performed by: FAMILY MEDICINE

## 2020-01-10 RX ORDER — METFORMIN HYDROCHLORIDE 500 MG/1
500 TABLET, EXTENDED RELEASE ORAL DAILY
Qty: 90 TAB | Refills: 1 | Status: SHIPPED | OUTPATIENT
Start: 2020-01-10 | End: 2020-07-14

## 2020-01-10 RX ORDER — EPINEPHRINE 0.3 MG/.3ML
0.3 INJECTION SUBCUTANEOUS ONCE
Qty: 2 EACH | Refills: 1 | Status: SHIPPED | OUTPATIENT
Start: 2020-01-10 | End: 2020-01-10

## 2020-01-10 SDOH — HEALTH STABILITY: MENTAL HEALTH: HOW OFTEN DO YOU HAVE A DRINK CONTAINING ALCOHOL?: NOT ASKED

## 2020-01-10 ASSESSMENT — PATIENT HEALTH QUESTIONNAIRE - PHQ9: CLINICAL INTERPRETATION OF PHQ2 SCORE: 0

## 2020-01-10 NOTE — PROGRESS NOTES
Subjective:   CC: annual PE     HPI:     Didi Ontiveros is a 49 y.o. female who is an established patient of the clinic, presents with the following concerns:     Patient presents to follow up on chronic medical conditions listed below. Patient was diagnosed with T2D on previous office visit on 6/7/19 and was initiated on trial of dietary modification, exercise and weight loss. A1c today in office is 6.2 compared to 6.8 during last visit. Patient has history of pure hypercholesterolemia and hypertriglyceridemia currently managed with dietary and lifestyle modifications.      Patient has history of bilateralcarpel tunnel syndrome, L worse than R. Patient states pain would be worse on some days and better on other days. She has been wearing a splint and continuing wrist exercises. She declined the need of further treatment at this time.     Patient has history of HART and obesity and states she has stopped her weight loss program after going to [place], however plans on starting a program offered by her insurance. She continues to take Vitamin D supplements and Fish oil supplements without side effect.     The patient notes she had a tingling in her face secondary to eating chili two years ago at [facility]. She notices now when she eats spicy foods or certain salsas, they give her the same symptoms in addition to skin rash on her neck, diaphoresis and sneezing. She denies any shortness of breath. She had hx of anaphylactic rxn to PCN in the past. She is concerned that she might have severe allergic reaction to foods.     Current medicines (including changes today)  Current Outpatient Medications   Medication Sig Dispense Refill   • metFORMIN ER (GLUCOPHAGE XR) 500 MG TABLET SR 24 HR Take 1 Tab by mouth every day. 90 Tab 1   • Omega-3 Fatty Acids (FISH OIL) 1000 MG CAPSULE DELAYED RELEASE EC softgel capsule Take 1 Cap by mouth 2 Times a Day. 180 Cap 1     No current facility-administered medications for this visit.   "    She  has a past medical history of ROMEO (obstructive sleep apnea).    I personally reviewed patient's problem list, allergies, medications, family hx, social hx with patient and update EPIC.     REVIEW OF SYSTEMS:  CONSTITUTIONAL:  Denies night sweats, fatigue, malaise, lethargy, fever or chills.  RESPIRATORY:  Denies cough, wheeze, hemoptysis, or shortness of breath.  CARDIOVASCULAR:  Denies chest pains, palpitations, pedal edema     Objective:     /62 (BP Location: Right arm, Patient Position: Sitting, BP Cuff Size: Large adult)   Pulse 61   Temp 37.1 °C (98.7 °F) (Temporal)   Resp 16   Ht 1.53 m (5' 0.24\")   Wt 82.6 kg (182 lb)   SpO2 97%  Body mass index is 35.27 kg/m².    Physical Exam:  Constitutional: awake, alert, in no distress.  Skin: Warm, dry, good turgor, no rashes, bruises, ulcers in visible areas.  Eye: conjunctiva clear, lids neg for edema or lesions.  ENMT: TM and auditory canals wnl. Oral and nasal mucosa wnl. Lips without lesions, good dentition, oropharynx clear.  Neck: Trachea midline, no masses, no thyromegaly. No cervical or supraclavicular lymphadenopathy  Respiratory: Unlabored respiratory effort, lungs clear to auscultation, no wheezes, no rales.  Cardiovascular: Normal S1, S2, no murmur, no pedal edema.  Psych: Oriented x3, affect and mood wnl, intact judgement and insight.       Assessment and Plan:   The following treatment plan was discussed    1. Type 2 diabetes mellitus without complication, without long-term current use of insulin (HCC)  Chronic, controlled, her A1C was 6.2 today. Denies visual changes, concerning lesions on her feet, symptoms of polyneuropathy. Plans;   - POCT Hemoglobin A1C  - Diabetic Monofilament Lower Extremity Exam  - metFORMIN ER (GLUCOPHAGE XR) 500 MG TABLET SR 24 HR; Take 1 Tab by mouth every day.  Dispense: 90 Tab; Refill: 1  - Discussed dietary modification, exercise, weight loss  - Annual eye exam  - Regular foot exam  - Side effects of all " medications discussed with patient  - Follow up in 6 months.     2. Pure hypercholesterolemia  3. Hypertriglyceridemia  Working on diet/lifestyle modification, declined pharmacotherapy.   - recommended dietary modification, exercise, and weight loss.       4. Bilateral carpal tunnel syndrome  Chronic, stable, responding well to conservative management, will cont to monitor.     5. HART (nonalcoholic steatohepatitis)  - weight loss, dietary modification    6. Obesity (BMI 30-39.9)  - Patient identified as having weight management issue.  Appropriate orders and counseling given.     7. Need for vaccination  - Hepatitis B Vaccine Adult IM  - Influenza Vaccine Quad Injection (PF)  - Pneumococal Polysaccharide Vaccine 23-Valent =>1YO SQ/IM    8. Allergic reaction to food, initial encounter  - EPINEPHrine (EPIPEN 2-KEILY) 0.3 MG/0.3ML Solution Auto-injector solution for injection; 0.3 mL by Intramuscular route Once for 1 dose.  Dispense: 2 Each; Refill: 1  - triggers avoidance.   - Benadryl PRN.     9. PE (physical exam), annual  General counseling provided, topics might include: diet, exercise, vitamin supplement, mental health, sleep, stress management, pap, mammogram, colonoscopy and vaccine recommendations.      - Comp Metabolic Panel; Future  - HEMOGLOBIN A1C; Future  - MICROALBUMIN CREAT RATIO URINE; Future  - Lipid Profile; Future  - CBC WITH DIFFERENTIAL; Future     Ashley Hook M.D.    Followup: Return in about 6 months (around 7/10/2020) for Multiple issues.    Please note that this dictation was created using voice recognition software and/or scribes. I have made every reasonable attempt to correct obvious errors, but I expect that there are errors of grammar and possibly content that I did not discover before finalizing the note.     Franci FLORES (Chris), am scribing for, and in the presence of, Aslhey Hook M.D.    Electronically signed by: Franci Myles (Chris), 1/10/2020    Ashley FLORES M.D. personally  performed the services described in this documentation, as scribed by Franci Myles in my presence, and it is both accurate and complete.

## 2020-01-14 ENCOUNTER — HOSPITAL ENCOUNTER (OUTPATIENT)
Dept: LAB | Facility: MEDICAL CENTER | Age: 50
End: 2020-01-14
Attending: FAMILY MEDICINE
Payer: COMMERCIAL

## 2020-01-14 DIAGNOSIS — Z00.00 PE (PHYSICAL EXAM), ANNUAL: ICD-10-CM

## 2020-01-14 LAB
ALBUMIN SERPL BCP-MCNC: 4.2 G/DL (ref 3.2–4.9)
ALBUMIN/GLOB SERPL: 1.4 G/DL
ALP SERPL-CCNC: 71 U/L (ref 30–99)
ALT SERPL-CCNC: 85 U/L (ref 2–50)
ANION GAP SERPL CALC-SCNC: 10 MMOL/L (ref 0–11.9)
AST SERPL-CCNC: 52 U/L (ref 12–45)
BASOPHILS # BLD AUTO: 1.1 % (ref 0–1.8)
BASOPHILS # BLD: 0.08 K/UL (ref 0–0.12)
BILIRUB SERPL-MCNC: 0.7 MG/DL (ref 0.1–1.5)
BUN SERPL-MCNC: 12 MG/DL (ref 8–22)
CALCIUM SERPL-MCNC: 8.6 MG/DL (ref 8.5–10.5)
CHLORIDE SERPL-SCNC: 105 MMOL/L (ref 96–112)
CHOLEST SERPL-MCNC: 174 MG/DL (ref 100–199)
CO2 SERPL-SCNC: 26 MMOL/L (ref 20–33)
CREAT SERPL-MCNC: 0.78 MG/DL (ref 0.5–1.4)
EOSINOPHIL # BLD AUTO: 0.2 K/UL (ref 0–0.51)
EOSINOPHIL NFR BLD: 2.7 % (ref 0–6.9)
ERYTHROCYTE [DISTWIDTH] IN BLOOD BY AUTOMATED COUNT: 44.4 FL (ref 35.9–50)
EST. AVERAGE GLUCOSE BLD GHB EST-MCNC: 140 MG/DL
FASTING STATUS PATIENT QL REPORTED: NORMAL
GLOBULIN SER CALC-MCNC: 3 G/DL (ref 1.9–3.5)
GLUCOSE SERPL-MCNC: 110 MG/DL (ref 65–99)
HBA1C MFR BLD: 6.5 % (ref 0–5.6)
HCT VFR BLD AUTO: 47.1 % (ref 37–47)
HDLC SERPL-MCNC: 31 MG/DL
HGB BLD-MCNC: 15 G/DL (ref 12–16)
IMM GRANULOCYTES # BLD AUTO: 0.02 K/UL (ref 0–0.11)
IMM GRANULOCYTES NFR BLD AUTO: 0.3 % (ref 0–0.9)
LDLC SERPL CALC-MCNC: 108 MG/DL
LYMPHOCYTES # BLD AUTO: 1.71 K/UL (ref 1–4.8)
LYMPHOCYTES NFR BLD: 23.2 % (ref 22–41)
MCH RBC QN AUTO: 29.9 PG (ref 27–33)
MCHC RBC AUTO-ENTMCNC: 31.8 G/DL (ref 33.6–35)
MCV RBC AUTO: 94 FL (ref 81.4–97.8)
MONOCYTES # BLD AUTO: 0.62 K/UL (ref 0–0.85)
MONOCYTES NFR BLD AUTO: 8.4 % (ref 0–13.4)
NEUTROPHILS # BLD AUTO: 4.73 K/UL (ref 2–7.15)
NEUTROPHILS NFR BLD: 64.3 % (ref 44–72)
NRBC # BLD AUTO: 0 K/UL
NRBC BLD-RTO: 0 /100 WBC
PLATELET # BLD AUTO: 228 K/UL (ref 164–446)
PMV BLD AUTO: 11.4 FL (ref 9–12.9)
POTASSIUM SERPL-SCNC: 3.7 MMOL/L (ref 3.6–5.5)
PROT SERPL-MCNC: 7.2 G/DL (ref 6–8.2)
RBC # BLD AUTO: 5.01 M/UL (ref 4.2–5.4)
SODIUM SERPL-SCNC: 141 MMOL/L (ref 135–145)
TRIGL SERPL-MCNC: 177 MG/DL (ref 0–149)
WBC # BLD AUTO: 7.4 K/UL (ref 4.8–10.8)

## 2020-01-14 PROCEDURE — 82570 ASSAY OF URINE CREATININE: CPT

## 2020-01-14 PROCEDURE — 36415 COLL VENOUS BLD VENIPUNCTURE: CPT

## 2020-01-14 PROCEDURE — 80053 COMPREHEN METABOLIC PANEL: CPT

## 2020-01-14 PROCEDURE — 85025 COMPLETE CBC W/AUTO DIFF WBC: CPT

## 2020-01-14 PROCEDURE — 80061 LIPID PANEL: CPT

## 2020-01-14 PROCEDURE — 82043 UR ALBUMIN QUANTITATIVE: CPT

## 2020-01-14 PROCEDURE — 83036 HEMOGLOBIN GLYCOSYLATED A1C: CPT

## 2020-01-15 LAB
CREAT UR-MCNC: 103.4 MG/DL
MICROALBUMIN UR-MCNC: <0.7 MG/DL
MICROALBUMIN/CREAT UR: NORMAL MG/G (ref 0–30)

## 2020-01-31 ENCOUNTER — OFFICE VISIT (OUTPATIENT)
Dept: MEDICAL GROUP | Age: 50
End: 2020-01-31
Payer: COMMERCIAL

## 2020-01-31 VITALS
BODY MASS INDEX: 35.73 KG/M2 | WEIGHT: 182 LBS | OXYGEN SATURATION: 95 % | DIASTOLIC BLOOD PRESSURE: 70 MMHG | HEART RATE: 85 BPM | HEIGHT: 60 IN | TEMPERATURE: 97.3 F | SYSTOLIC BLOOD PRESSURE: 120 MMHG

## 2020-01-31 DIAGNOSIS — K75.81 NASH (NONALCOHOLIC STEATOHEPATITIS): ICD-10-CM

## 2020-01-31 DIAGNOSIS — L91.8 SKIN TAG: ICD-10-CM

## 2020-01-31 DIAGNOSIS — E11.9 TYPE 2 DIABETES MELLITUS WITHOUT COMPLICATION, WITHOUT LONG-TERM CURRENT USE OF INSULIN (HCC): ICD-10-CM

## 2020-01-31 DIAGNOSIS — E78.00 PURE HYPERCHOLESTEROLEMIA: ICD-10-CM

## 2020-01-31 PROCEDURE — 11201 RMVL SKIN TAGS EA ADDL 10: CPT | Performed by: FAMILY MEDICINE

## 2020-01-31 PROCEDURE — 11200 RMVL SKIN TAGS UP TO&INC 15: CPT | Performed by: FAMILY MEDICINE

## 2020-01-31 PROCEDURE — 99214 OFFICE O/P EST MOD 30 MIN: CPT | Mod: 25 | Performed by: FAMILY MEDICINE

## 2020-01-31 RX ORDER — EPINEPHRINE 0.3 MG/.3ML
INJECTION SUBCUTANEOUS
COMMUNITY
Start: 2020-01-10 | End: 2021-06-03

## 2020-01-31 RX ORDER — PRAVASTATIN SODIUM 10 MG
10 TABLET ORAL DAILY
Qty: 90 TAB | Refills: 1 | Status: SHIPPED | OUTPATIENT
Start: 2020-01-31 | End: 2020-07-31

## 2020-01-31 NOTE — PROGRESS NOTES
Subjective:   CC: inflamed skin tags removal, lab review    HPI:     Didi nOtiveros is a 50 y.o. female who is an established patient of the clinic, presents with the following concerns:     Patient presents today for removal of multiple skin tags around her neck. These skin tags rubs against her collar and jewelry leading to discomfort.     The patient has labs completed prior to this visit and would like to discuss the results.    The patient has a history of chronic Diabetes.  She is compliant with her medications and denies any side effects from them. Most recent A1C was 6.4.     Current medicines (including changes today)  Current Outpatient Medications   Medication Sig Dispense Refill   • EPINEPHrine (EPIPEN) 0.3 MG/0.3ML Solution Auto-injector solution for injection      • pravastatin (PRAVACHOL) 10 MG Tab Take 1 Tab by mouth every day. 90 Tab 1   • metFORMIN ER (GLUCOPHAGE XR) 500 MG TABLET SR 24 HR Take 1 Tab by mouth every day. 90 Tab 1   • Omega-3 Fatty Acids (FISH OIL) 1000 MG CAPSULE DELAYED RELEASE EC softgel capsule Take 1 Cap by mouth 2 Times a Day. 180 Cap 1     No current facility-administered medications for this visit.      She  has a past medical history of ROMEO (obstructive sleep apnea).    I personally reviewed patient's problem list, allergies, medications, family hx, social hx with patient and update EPIC.     REVIEW OF SYSTEMS:  CONSTITUTIONAL:  Denies night sweats, fatigue, malaise, lethargy, fever or chills.  RESPIRATORY:  Denies cough, wheeze, hemoptysis, or shortness of breath.  CARDIOVASCULAR:  Denies chest pains, palpitations, pedal edema     Objective:     /70 (BP Location: Right arm, Patient Position: Sitting, BP Cuff Size: Adult)   Pulse 85   Temp 36.3 °C (97.3 °F) (Temporal)   Ht 1.524 m (5')   Wt 82.6 kg (182 lb)   SpO2 95%  Body mass index is 35.54 kg/m².    Physical Exam:  Constitutional: awake, alert, in no distress. Obese.  Skin: Warm, dry, good turgor, no rashes,  bruises, ulcers in visible areas.  -multiple skin tags on around her neck, some are tender to touch.   Eye: conjunctiva clear, lids neg for edema or lesions.  ENMT: TM and auditory canals wnl. Oral and nasal mucosa wnl. Lips without lesions, good dentition, oropharynx clear.  Neck: Trachea midline, no masses, no thyromegaly. No cervical or supraclavicular lymphadenopathy  Respiratory: Unlabored respiratory effort, lungs clear to auscultation, no wheezes, no rales.  Cardiovascular: Normal S1, S2, no murmur, no pedal edema.  Psych: Oriented x3, affect and mood wnl, intact judgement and insight.       Assessment and Plan:   The following treatment plan was discussed    1. HART (nonalcoholic steatohepatitis)  - weight loss  - vitamin E supplement  - avoidance of alcohol and hepatotoxic drugs    2. Type 2 diabetes mellitus without complication, without long-term current use of insulin (HCC)  Chronic, controlled with Metformin 500 mg qd , last A1C was 6.5. denies visual changes, concerning lesions on her feet, symptoms of polyneuropathy.   - HEMOGLOBIN A1C; Future  - Comp Metabolic Panel; Future  - Discussed dietary modification, exercise, weight loss  - Annual eye exam  - Regular foot exam  - Follow up in 6 months.     3. Pure hypercholesterolemia  - pravastatin (PRAVACHOL) 10 MG Tab; Take 1 Tab by mouth every day.  Dispense: 90 Tab; Refill: 1  - Lipid Profile; Future  - recommended dietary modification, exercise, and weight loss.      4. Skin tag  Multiple tender large skin tags around her neck.   - cryo therapy and surgical removal, see procedure note below.     Procedure note: Skin tag removal  Indication: Inflamed skin tag  Location: around her neck  Verbal consent obtained  The concern lesion was cleansed thoroughly with alcohol swab.  Local anesthesia was achieved with lidocaine 1% without epinephrine. The skin tag was lifted by forcep and completely excised with a scissor.  No or minimal bleeding observed.  Hemostasis achieved with direct pressure. The wounds were dressed with sterile adhesive bandages. Patient tolerated the procedure well, no immediate complication noticed. 13 skin tags removed surgically. 5 small skin tags were removed by cryotherapy.   Total: 18.     Ashlye Hook M.D.    Followup: Return in about 6 months (around 7/31/2020) for As needed, Multiple issues.    Please note that this dictation was created using voice recognition software and/or scribes. I have made every reasonable attempt to correct obvious errors, but I expect that there are errors of grammar and possibly content that I did not discover before finalizing the note.     Zuleika FLORES (Bharathiibe), am scribing for, and in the presence of, Ashley Hook M.D.    Electronically signed by: Zuleika Mott (Chris), 1/31/2020    Ashley FLORES M.D. personally performed the services described in this documentation, as scribed by Zuleika Mott in my presence, and it is both accurate and complete.

## 2020-05-18 ENCOUNTER — PATIENT MESSAGE (OUTPATIENT)
Dept: HEALTH INFORMATION MANAGEMENT | Facility: OTHER | Age: 50
End: 2020-05-18

## 2020-10-06 DIAGNOSIS — E11.9 TYPE 2 DIABETES MELLITUS WITHOUT COMPLICATION, WITHOUT LONG-TERM CURRENT USE OF INSULIN (HCC): ICD-10-CM

## 2020-10-06 RX ORDER — METFORMIN HYDROCHLORIDE 500 MG/1
TABLET, EXTENDED RELEASE ORAL
Qty: 30 TAB | Refills: 0 | Status: SHIPPED | OUTPATIENT
Start: 2020-10-06 | End: 2020-11-03

## 2020-10-06 NOTE — TELEPHONE ENCOUNTER
Phone Number Called: [phone] (home)       Call outcome: Did not leave a detailed message. Requested patient to call back.    Message: LVM 1st attempt

## 2020-10-06 NOTE — TELEPHONE ENCOUNTER
Phone Number Called: [phone]    Call outcome: Spoke to patient regarding message below.    Message: Patient is scheduled for DMRN/PCP visit 11/12

## 2020-10-06 NOTE — TELEPHONE ENCOUNTER
Please schedule appointment for DM f/u and med refills. Please advise pt to do fasting blood tests prior to next OV.   Ashley Hook M.D.

## 2020-11-03 DIAGNOSIS — E11.9 TYPE 2 DIABETES MELLITUS WITHOUT COMPLICATION, WITHOUT LONG-TERM CURRENT USE OF INSULIN (HCC): ICD-10-CM

## 2020-11-04 RX ORDER — METFORMIN HYDROCHLORIDE 500 MG/1
TABLET, EXTENDED RELEASE ORAL
Qty: 30 TAB | Refills: 0 | Status: SHIPPED | OUTPATIENT
Start: 2020-11-04 | End: 2020-12-02

## 2020-11-06 ENCOUNTER — HOSPITAL ENCOUNTER (OUTPATIENT)
Dept: LAB | Facility: MEDICAL CENTER | Age: 50
End: 2020-11-06
Attending: FAMILY MEDICINE
Payer: COMMERCIAL

## 2020-11-06 DIAGNOSIS — E78.00 PURE HYPERCHOLESTEROLEMIA: ICD-10-CM

## 2020-11-06 DIAGNOSIS — E11.9 TYPE 2 DIABETES MELLITUS WITHOUT COMPLICATION, WITHOUT LONG-TERM CURRENT USE OF INSULIN (HCC): ICD-10-CM

## 2020-11-06 LAB
ALBUMIN SERPL BCP-MCNC: 4.4 G/DL (ref 3.2–4.9)
ALBUMIN/GLOB SERPL: 1.4 G/DL
ALP SERPL-CCNC: 82 U/L (ref 30–99)
ALT SERPL-CCNC: 94 U/L (ref 2–50)
ANION GAP SERPL CALC-SCNC: 13 MMOL/L (ref 7–16)
AST SERPL-CCNC: 52 U/L (ref 12–45)
BILIRUB SERPL-MCNC: 0.6 MG/DL (ref 0.1–1.5)
BUN SERPL-MCNC: 14 MG/DL (ref 8–22)
CALCIUM SERPL-MCNC: 9.3 MG/DL (ref 8.5–10.5)
CHLORIDE SERPL-SCNC: 101 MMOL/L (ref 96–112)
CHOLEST SERPL-MCNC: 201 MG/DL (ref 100–199)
CO2 SERPL-SCNC: 26 MMOL/L (ref 20–33)
CREAT SERPL-MCNC: 0.75 MG/DL (ref 0.5–1.4)
FASTING STATUS PATIENT QL REPORTED: NORMAL
GLOBULIN SER CALC-MCNC: 3.2 G/DL (ref 1.9–3.5)
GLUCOSE SERPL-MCNC: 129 MG/DL (ref 65–99)
HDLC SERPL-MCNC: 36 MG/DL
LDLC SERPL CALC-MCNC: 124 MG/DL
POTASSIUM SERPL-SCNC: 3.6 MMOL/L (ref 3.6–5.5)
PROT SERPL-MCNC: 7.6 G/DL (ref 6–8.2)
SODIUM SERPL-SCNC: 140 MMOL/L (ref 135–145)
TRIGL SERPL-MCNC: 204 MG/DL (ref 0–149)

## 2020-11-06 PROCEDURE — 80061 LIPID PANEL: CPT

## 2020-11-06 PROCEDURE — 80053 COMPREHEN METABOLIC PANEL: CPT

## 2020-11-06 PROCEDURE — 83036 HEMOGLOBIN GLYCOSYLATED A1C: CPT

## 2020-11-06 PROCEDURE — 36415 COLL VENOUS BLD VENIPUNCTURE: CPT

## 2020-11-07 LAB
EST. AVERAGE GLUCOSE BLD GHB EST-MCNC: 143 MG/DL
HBA1C MFR BLD: 6.6 % (ref 0–5.6)

## 2020-11-12 ENCOUNTER — OFFICE VISIT (OUTPATIENT)
Dept: MEDICAL GROUP | Age: 50
End: 2020-11-12
Payer: COMMERCIAL

## 2020-11-12 VITALS
DIASTOLIC BLOOD PRESSURE: 70 MMHG | TEMPERATURE: 97.6 F | BODY MASS INDEX: 33.64 KG/M2 | HEART RATE: 82 BPM | HEIGHT: 61 IN | SYSTOLIC BLOOD PRESSURE: 108 MMHG | WEIGHT: 178.2 LBS

## 2020-11-12 DIAGNOSIS — Z12.31 ENCOUNTER FOR SCREENING MAMMOGRAM FOR BREAST CANCER: ICD-10-CM

## 2020-11-12 DIAGNOSIS — M18.12 PRIMARY OSTEOARTHRITIS OF FIRST CARPOMETACARPAL JOINT OF LEFT HAND: ICD-10-CM

## 2020-11-12 DIAGNOSIS — E11.9 TYPE 2 DIABETES MELLITUS WITHOUT COMPLICATION, WITHOUT LONG-TERM CURRENT USE OF INSULIN (HCC): Primary | ICD-10-CM

## 2020-11-12 DIAGNOSIS — G47.33 OSA (OBSTRUCTIVE SLEEP APNEA): ICD-10-CM

## 2020-11-12 DIAGNOSIS — E78.00 PURE HYPERCHOLESTEROLEMIA: ICD-10-CM

## 2020-11-12 DIAGNOSIS — E78.1 HYPERTRIGLYCERIDEMIA: ICD-10-CM

## 2020-11-12 DIAGNOSIS — Z23 NEED FOR VACCINATION: ICD-10-CM

## 2020-11-12 DIAGNOSIS — Z00.00 PE (PHYSICAL EXAM), ANNUAL: ICD-10-CM

## 2020-11-12 DIAGNOSIS — K75.81 NASH (NONALCOHOLIC STEATOHEPATITIS): ICD-10-CM

## 2020-11-12 PROCEDURE — 99214 OFFICE O/P EST MOD 30 MIN: CPT | Mod: 25 | Performed by: FAMILY MEDICINE

## 2020-11-12 PROCEDURE — 90686 IIV4 VACC NO PRSV 0.5 ML IM: CPT | Performed by: FAMILY MEDICINE

## 2020-11-12 PROCEDURE — 90746 HEPB VACCINE 3 DOSE ADULT IM: CPT | Performed by: FAMILY MEDICINE

## 2020-11-12 PROCEDURE — 90472 IMMUNIZATION ADMIN EACH ADD: CPT | Performed by: FAMILY MEDICINE

## 2020-11-12 PROCEDURE — 90471 IMMUNIZATION ADMIN: CPT | Performed by: FAMILY MEDICINE

## 2020-11-12 RX ORDER — NAPROXEN 500 MG/1
500 TABLET ORAL
Qty: 30 TAB | Refills: 0 | Status: SHIPPED | OUTPATIENT
Start: 2020-11-12 | End: 2020-11-27

## 2020-11-12 RX ORDER — ATORVASTATIN CALCIUM 20 MG/1
20 TABLET, FILM COATED ORAL
Qty: 90 TAB | Refills: 1 | Status: SHIPPED | OUTPATIENT
Start: 2020-11-12 | End: 2021-06-03 | Stop reason: SDUPTHER

## 2020-11-12 ASSESSMENT — FIBROSIS 4 INDEX: FIB4 SCORE: 1.22

## 2020-11-12 NOTE — PROGRESS NOTES
Subjective:   CC: DM f/u     HPI:     Didi Ontiveros is a 50 y.o. female, established patient of the clinic, presents with the following concerns:     Patient had chronic type 2 diabetes, Segura, mixed hyperlipidemia, sleep apnea.  Patient is taking all medications as directed.  She tolerated medications well, no side effect reported.  Patient is active, but does not exercise regularly.  She lost about 4 pounds since last office visit.    Patient has chronic bilateral wrist pain secondary to carpal tunnel syndrome. CTS is previously controlled with conservative treatment. However, over the past 2 weeks, patient complains of acute worsening left lateral wrist pain without history of trauma.  Patient is right-handed.  Her employment requires prolonged computer/keyboard usage.  Pain is described as sharp, mostly felt at the first CMC joint of the left hand, radiates to the L thenar. Pain is worse with certain movement of the L hand. She tried Diclofenac gel PRN which appears to help. She also uses wrist splint PRN for CTS. Paresthesia associated with bilateral CTS is unchanged. She continues to do CTS exercise regularly as directed.     Current medicines (including changes today)  Current Outpatient Medications   Medication Sig Dispense Refill   • naproxen (NAPROSYN) 500 MG Tab Take 1 Tab by mouth 2 times daily with meals as needed (left wrist pain) for up to 15 days. 30 Tab 0   • atorvastatin (LIPITOR) 20 MG Tab Take 1 Tab by mouth every bedtime. 90 Tab 1   • metFORMIN ER (GLUCOPHAGE XR) 500 MG TABLET SR 24 HR TAKE 1 TABLET BY MOUTH EVERY DAY 30 Tab 0   • EPINEPHrine (EPIPEN) 0.3 MG/0.3ML Solution Auto-injector solution for injection      • Omega-3 Fatty Acids (FISH OIL) 1000 MG CAPSULE DELAYED RELEASE EC softgel capsule Take 1 Cap by mouth 2 Times a Day. 180 Cap 1     No current facility-administered medications for this visit.      She  has a past medical history of ROMEO (obstructive sleep apnea).    I personally  "reviewed patient's problem list, allergies, medications, family hx, social hx with patient and update EPIC.     REVIEW OF SYSTEMS:  CONSTITUTIONAL:  Denies night sweats, fatigue, malaise, lethargy, fever or chills.  RESPIRATORY:  Denies cough, wheeze, hemoptysis, or shortness of breath.  CARDIOVASCULAR:  Denies chest pains, palpitations, pedal edema     Objective:     /70 (BP Location: Left arm, Patient Position: Sitting, BP Cuff Size: Adult)   Pulse 82   Temp 36.4 °C (97.6 °F) (Temporal)   Ht 1.549 m (5' 1\")   Wt 80.8 kg (178 lb 3.2 oz)  Body mass index is 33.67 kg/m².    Physical Exam:  Constitutional: awake, alert, in no distress. obese.  Skin: Warm, dry, good turgor, no rashes, bruises, ulcers in visible areas.  Eye: conjunctiva clear, lids neg for edema or lesions.  ENMT: TM and auditory canals wnl.  Neck: Trachea midline, no masses, no thyromegaly. No cervical or supraclavicular lymphadenopathy  Respiratory: Unlabored respiratory effort, lungs clear to auscultation, no wheezes, no rales.  Cardiovascular: Normal S1, S2, no murmur, no pedal edema.  Psych: Oriented x3, affect and mood wnl, intact judgement and insight.       Assessment and Plan:   The following treatment plan was discussed    I reviewed note done by our RN.     1. Type 2 diabetes mellitus without complication, without long-term current use of insulin (HCC)  Chronic, controlled with Metformin 500 mg qd. Her A1C was 6.6 per most recent blood tests.   - cont Metformin  mg qd.   - dietary modification, exercise, weight loss  - Annual eye exam  - Regular foot exam  - Follow up in 6 months.   - Diabetic Monofilament LE Exam  - CBC WITH DIFFERENTIAL; Future  - Comp Metabolic Panel; Future  - HEMOGLOBIN A1C; Future  - MICROALBUMIN CREAT RATIO URINE; Future    2. HART (nonalcoholic steatohepatitis)  - weight loss     3. Hypertriglyceridemia  4. Pure hypercholesterolemia  Chronic, not controlled with Pravachol 10 mg qd. Given type 2 DM, " will discontinue Pravachol and start Atorvastatin.   - discontinued Pravachol.   - atorvastatin (LIPITOR) 20 MG Tab; Take 1 Tab by mouth every bedtime.  Dispense: 90 Tab; Refill: 1  - Lipid Profile; Future  - dietary modification, exercise, and weight loss.   - avoid alcohol, drugs, tobacco products     5. ROMEO (obstructive sleep apnea)  Pt was referred to sleep medicine for eval, but loss follow up.   She continues to have mild intermittent daytime symptoms.   - REFERRAL TO PULMONARY AND SLEEP MEDICINE    6. Primary osteoarthritis of first carpometacarpal joint of left hand  - REFERRAL TO HAND SURGERY  - naproxen (NAPROSYN) 500 MG Tab; Take 1 Tab by mouth 2 times daily with meals as needed (left wrist pain) for up to 15 days.  Dispense: 30 Tab; Refill: 0  - rest, icing, activity modification.     7. Encounter for screening mammogram for breast cancer  - MA-SCREENING MAMMO BILAT W/CAD; Future    8. Need for vaccination  - Influenza Vaccine Quad Injection (PF)  - Hepatitis B Vaccine Adult IM    Patient was seen for 40 minutes face to face of which greater than 50% of appointment time was spent on counseling and coordination of care regarding the above     Ly SHAUN Hook M.D.      Followup: Return in about 6 months (around 5/12/2021) for annual PE.    Please note that this dictation was created using voice recognition software. I have made every reasonable attempt to correct obvious errors, but I expect that there are errors of grammar and possibly content that I did not discover before finalizing the note.

## 2020-11-12 NOTE — PROGRESS NOTES
RN-CDE Note    Subjective:   HPI:  Didi Ontiveros is a 50 y.o. old patient who is seen today for review of her controlled type 2 diabetes.      DM:   Last A1c:   Lab Results   Component Value Date/Time    HBA1C 6.6 (H) 11/06/2020 06:23 AM      A1C GOAL: < 7    Diabetes Medications:   Metformin 500 mg daily  Taking above medications as prescribed: yes  Taking daily ASA: No    Exercise: no regular exercise, sedentary  Diet: tries to eat healthy.   Patient's body mass index is 33.67 kg/m². Exercise and nutrition counseling were performed at this visit.    Glucose monitoring frequency: states she is testing one time per day.   Last week average was 162 with 6 tests  This weeks average is 113 with 2 tests.     Hypoglycemic episodes: no  Last Retinal Exam: on file and up-to-date  Daily Foot Exam: Yes denies problem.   Foot Exam:  Monofilament: done  Monofilament testing with a 10 gram force: sensation intact: intact bilaterally  Visual Inspection: Feet without maceration, ulcers, fissures.  Pedal pulses: intact bilaterally   Lab Results   Component Value Date/Time    MALBCRT see below 01/14/2020 06:05 AM    MICROALBUR <0.7 01/14/2020 06:05 AM      ACR Albumin/Creatinine Ratio goal <30   Currently Rx ACE/ARB: No   Dyslipidemia:  Lab Results   Component Value Date/Time    CHOLSTRLTOT 201 (H) 11/06/2020 06:23 AM     (H) 11/06/2020 06:23 AM    HDL 36 (A) 11/06/2020 06:23 AM    TRIGLYCERIDE 204 (H) 11/06/2020 06:23 AM       Currently Rx Statin: Yes Pravastatin 10 mg daily    She  reports that she has never smoked. She has never used smokeless tobacco.      Plan:     Discussed and educated on:   - Discussed diabetic diet, encouraged portion control.   - Discussed importance of daily exercise, recommended 30 minutes per day  - Reviewed medications and advised how to take.  - Discussed importance of immunizations and yearly eye exams.   - Advised daily foot  exams. Educated on signs of infection.   - Educated on need to  stay well hydrated with water.  - Educated to call with any questions or problems.    Recommended medication changes: none at this time.

## 2020-12-01 DIAGNOSIS — E11.9 TYPE 2 DIABETES MELLITUS WITHOUT COMPLICATION, WITHOUT LONG-TERM CURRENT USE OF INSULIN (HCC): ICD-10-CM

## 2020-12-02 RX ORDER — METFORMIN HYDROCHLORIDE 500 MG/1
TABLET, EXTENDED RELEASE ORAL
Qty: 90 TAB | Refills: 1 | Status: SHIPPED | OUTPATIENT
Start: 2020-12-02 | End: 2021-06-03 | Stop reason: SDUPTHER

## 2021-02-23 ENCOUNTER — HOSPITAL ENCOUNTER (OUTPATIENT)
Dept: RADIOLOGY | Facility: MEDICAL CENTER | Age: 51
End: 2021-02-23
Attending: FAMILY MEDICINE
Payer: COMMERCIAL

## 2021-02-23 DIAGNOSIS — Z12.31 ENCOUNTER FOR SCREENING MAMMOGRAM FOR BREAST CANCER: ICD-10-CM

## 2021-02-23 PROCEDURE — 77063 BREAST TOMOSYNTHESIS BI: CPT

## 2021-05-14 ENCOUNTER — APPOINTMENT (OUTPATIENT)
Dept: MEDICAL GROUP | Age: 51
End: 2021-05-14
Payer: COMMERCIAL

## 2021-05-27 ENCOUNTER — APPOINTMENT (OUTPATIENT)
Dept: MEDICAL GROUP | Age: 51
End: 2021-05-27
Payer: COMMERCIAL

## 2021-05-28 ENCOUNTER — HOSPITAL ENCOUNTER (OUTPATIENT)
Dept: LAB | Facility: MEDICAL CENTER | Age: 51
End: 2021-05-28
Attending: FAMILY MEDICINE
Payer: COMMERCIAL

## 2021-05-28 DIAGNOSIS — E78.1 HYPERTRIGLYCERIDEMIA: ICD-10-CM

## 2021-05-28 DIAGNOSIS — E11.9 TYPE 2 DIABETES MELLITUS WITHOUT COMPLICATION, WITHOUT LONG-TERM CURRENT USE OF INSULIN (HCC): ICD-10-CM

## 2021-05-28 DIAGNOSIS — E78.00 PURE HYPERCHOLESTEROLEMIA: ICD-10-CM

## 2021-05-28 DIAGNOSIS — Z00.00 PE (PHYSICAL EXAM), ANNUAL: ICD-10-CM

## 2021-05-28 LAB
ALBUMIN SERPL BCP-MCNC: 4.2 G/DL (ref 3.2–4.9)
ALBUMIN/GLOB SERPL: 1.3 G/DL
ALP SERPL-CCNC: 95 U/L (ref 30–99)
ALT SERPL-CCNC: 137 U/L (ref 2–50)
ANION GAP SERPL CALC-SCNC: 12 MMOL/L (ref 7–16)
AST SERPL-CCNC: 76 U/L (ref 12–45)
BASOPHILS # BLD AUTO: 0.8 % (ref 0–1.8)
BASOPHILS # BLD: 0.06 K/UL (ref 0–0.12)
BILIRUB SERPL-MCNC: 0.4 MG/DL (ref 0.1–1.5)
BUN SERPL-MCNC: 11 MG/DL (ref 8–22)
CALCIUM SERPL-MCNC: 8.8 MG/DL (ref 8.5–10.5)
CHLORIDE SERPL-SCNC: 106 MMOL/L (ref 96–112)
CHOLEST SERPL-MCNC: 173 MG/DL (ref 100–199)
CO2 SERPL-SCNC: 24 MMOL/L (ref 20–33)
CREAT SERPL-MCNC: 0.64 MG/DL (ref 0.5–1.4)
CREAT UR-MCNC: 242.04 MG/DL
EOSINOPHIL # BLD AUTO: 0.29 K/UL (ref 0–0.51)
EOSINOPHIL NFR BLD: 3.9 % (ref 0–6.9)
ERYTHROCYTE [DISTWIDTH] IN BLOOD BY AUTOMATED COUNT: 44.5 FL (ref 35.9–50)
EST. AVERAGE GLUCOSE BLD GHB EST-MCNC: 148 MG/DL
FASTING STATUS PATIENT QL REPORTED: NORMAL
GLOBULIN SER CALC-MCNC: 3.2 G/DL (ref 1.9–3.5)
GLUCOSE SERPL-MCNC: 135 MG/DL (ref 65–99)
HBA1C MFR BLD: 6.8 % (ref 4–5.6)
HCT VFR BLD AUTO: 46.7 % (ref 37–47)
HDLC SERPL-MCNC: 32 MG/DL
HGB BLD-MCNC: 15.2 G/DL (ref 12–16)
IMM GRANULOCYTES # BLD AUTO: 0.03 K/UL (ref 0–0.11)
IMM GRANULOCYTES NFR BLD AUTO: 0.4 % (ref 0–0.9)
LDLC SERPL CALC-MCNC: 104 MG/DL
LYMPHOCYTES # BLD AUTO: 2.26 K/UL (ref 1–4.8)
LYMPHOCYTES NFR BLD: 30.3 % (ref 22–41)
MCH RBC QN AUTO: 30.6 PG (ref 27–33)
MCHC RBC AUTO-ENTMCNC: 32.5 G/DL (ref 33.6–35)
MCV RBC AUTO: 94 FL (ref 81.4–97.8)
MICROALBUMIN UR-MCNC: 1.5 MG/DL
MICROALBUMIN/CREAT UR: 6 MG/G (ref 0–30)
MONOCYTES # BLD AUTO: 0.61 K/UL (ref 0–0.85)
MONOCYTES NFR BLD AUTO: 8.2 % (ref 0–13.4)
NEUTROPHILS # BLD AUTO: 4.22 K/UL (ref 2–7.15)
NEUTROPHILS NFR BLD: 56.4 % (ref 44–72)
NRBC # BLD AUTO: 0 K/UL
NRBC BLD-RTO: 0 /100 WBC
PLATELET # BLD AUTO: 246 K/UL (ref 164–446)
PMV BLD AUTO: 11.6 FL (ref 9–12.9)
POTASSIUM SERPL-SCNC: 3.8 MMOL/L (ref 3.6–5.5)
PROT SERPL-MCNC: 7.4 G/DL (ref 6–8.2)
RBC # BLD AUTO: 4.97 M/UL (ref 4.2–5.4)
SODIUM SERPL-SCNC: 142 MMOL/L (ref 135–145)
TRIGL SERPL-MCNC: 183 MG/DL (ref 0–149)
WBC # BLD AUTO: 7.5 K/UL (ref 4.8–10.8)

## 2021-05-28 PROCEDURE — 80061 LIPID PANEL: CPT

## 2021-05-28 PROCEDURE — 82570 ASSAY OF URINE CREATININE: CPT

## 2021-05-28 PROCEDURE — 82306 VITAMIN D 25 HYDROXY: CPT

## 2021-05-28 PROCEDURE — 82043 UR ALBUMIN QUANTITATIVE: CPT

## 2021-05-28 PROCEDURE — 85025 COMPLETE CBC W/AUTO DIFF WBC: CPT

## 2021-05-28 PROCEDURE — 80053 COMPREHEN METABOLIC PANEL: CPT

## 2021-05-28 PROCEDURE — 83036 HEMOGLOBIN GLYCOSYLATED A1C: CPT

## 2021-05-28 PROCEDURE — 36415 COLL VENOUS BLD VENIPUNCTURE: CPT

## 2021-05-31 LAB — 25(OH)D3 SERPL-MCNC: 28 NG/ML (ref 30–80)

## 2021-06-03 DIAGNOSIS — E78.1 HYPERTRIGLYCERIDEMIA: ICD-10-CM

## 2021-06-03 DIAGNOSIS — E78.00 PURE HYPERCHOLESTEROLEMIA: ICD-10-CM

## 2021-06-03 DIAGNOSIS — E11.9 TYPE 2 DIABETES MELLITUS WITHOUT COMPLICATION, WITHOUT LONG-TERM CURRENT USE OF INSULIN (HCC): ICD-10-CM

## 2021-06-03 PROBLEM — G47.34 NOCTURNAL HYPOXIA: Status: ACTIVE | Noted: 2018-05-31

## 2021-06-03 RX ORDER — ATORVASTATIN CALCIUM 20 MG/1
20 TABLET, FILM COATED ORAL
Qty: 90 TABLET | Refills: 1 | Status: SHIPPED | OUTPATIENT
Start: 2021-06-03 | End: 2021-09-13 | Stop reason: SDUPTHER

## 2021-06-03 RX ORDER — METFORMIN HYDROCHLORIDE 500 MG/1
500 TABLET, EXTENDED RELEASE ORAL
Qty: 90 TABLET | Refills: 1 | Status: SHIPPED | OUTPATIENT
Start: 2021-06-03 | End: 2021-09-13 | Stop reason: SDUPTHER

## 2021-09-13 ENCOUNTER — OFFICE VISIT (OUTPATIENT)
Dept: MEDICAL GROUP | Age: 51
End: 2021-09-13
Payer: COMMERCIAL

## 2021-09-13 VITALS
HEIGHT: 61 IN | TEMPERATURE: 97.2 F | BODY MASS INDEX: 34.74 KG/M2 | HEART RATE: 71 BPM | SYSTOLIC BLOOD PRESSURE: 120 MMHG | WEIGHT: 184 LBS | OXYGEN SATURATION: 96 % | DIASTOLIC BLOOD PRESSURE: 70 MMHG

## 2021-09-13 DIAGNOSIS — Z00.00 PE (PHYSICAL EXAM), ANNUAL: Primary | ICD-10-CM

## 2021-09-13 DIAGNOSIS — G47.34 NOCTURNAL HYPOXIA: ICD-10-CM

## 2021-09-13 DIAGNOSIS — K75.81 NASH (NONALCOHOLIC STEATOHEPATITIS): ICD-10-CM

## 2021-09-13 DIAGNOSIS — E66.9 OBESITY (BMI 30-39.9): ICD-10-CM

## 2021-09-13 DIAGNOSIS — Z91.018 FOOD ALLERGY: ICD-10-CM

## 2021-09-13 DIAGNOSIS — E78.00 PURE HYPERCHOLESTEROLEMIA: ICD-10-CM

## 2021-09-13 DIAGNOSIS — E55.9 VITAMIN D INSUFFICIENCY: ICD-10-CM

## 2021-09-13 DIAGNOSIS — Z23 NEED FOR VACCINATION: ICD-10-CM

## 2021-09-13 DIAGNOSIS — E11.9 TYPE 2 DIABETES MELLITUS WITHOUT COMPLICATION, WITHOUT LONG-TERM CURRENT USE OF INSULIN (HCC): ICD-10-CM

## 2021-09-13 PROBLEM — M18.12 PRIMARY OSTEOARTHRITIS OF FIRST CARPOMETACARPAL JOINT OF LEFT HAND: Status: RESOLVED | Noted: 2020-11-12 | Resolved: 2021-09-13

## 2021-09-13 PROBLEM — E78.1 HYPERTRIGLYCERIDEMIA: Status: RESOLVED | Noted: 2019-06-10 | Resolved: 2021-09-13

## 2021-09-13 LAB
HBA1C MFR BLD: 6.9 % (ref 0–5.6)
INT CON NEG: ABNORMAL
INT CON POS: ABNORMAL

## 2021-09-13 PROCEDURE — 83036 HEMOGLOBIN GLYCOSYLATED A1C: CPT | Performed by: FAMILY MEDICINE

## 2021-09-13 PROCEDURE — 99396 PREV VISIT EST AGE 40-64: CPT | Mod: 25 | Performed by: FAMILY MEDICINE

## 2021-09-13 PROCEDURE — 90746 HEPB VACCINE 3 DOSE ADULT IM: CPT | Performed by: FAMILY MEDICINE

## 2021-09-13 PROCEDURE — 90471 IMMUNIZATION ADMIN: CPT | Performed by: FAMILY MEDICINE

## 2021-09-13 RX ORDER — ATORVASTATIN CALCIUM 20 MG/1
20 TABLET, FILM COATED ORAL
Qty: 90 TABLET | Refills: 1 | Status: SHIPPED | OUTPATIENT
Start: 2021-09-13 | End: 2022-08-12 | Stop reason: SDUPTHER

## 2021-09-13 RX ORDER — EPINEPHRINE 0.3 MG/.3ML
0.3 INJECTION SUBCUTANEOUS ONCE
Qty: 2 EACH | Refills: 1 | Status: SHIPPED | OUTPATIENT
Start: 2021-09-13 | End: 2021-09-13

## 2021-09-13 RX ORDER — METFORMIN HYDROCHLORIDE 500 MG/1
500 TABLET, EXTENDED RELEASE ORAL
Qty: 90 TABLET | Refills: 1 | Status: SHIPPED | OUTPATIENT
Start: 2021-09-13 | End: 2022-08-12

## 2021-09-13 ASSESSMENT — FIBROSIS 4 INDEX: FIB4 SCORE: 1.4

## 2021-09-15 NOTE — PROGRESS NOTES
Subjective:   CC: annual PE    HPI:     Didi Ontiveros is a 51 y.o. female, established patient of the clinic.     Patient has chronic type 2 diabetes, hyperlipidemia, vitamin D insufficiency, obesity.  Patient is taking all medication as directed.  She tolerates them well, no side effect reported.  She gains a few pounds since last office visit.  She is active, but does not exercise regularly.  Negative visual changes, concerning lesion on her feet, symptoms of polyneuropathy.  She is due for retinal exam and will schedule appointment.    Patient has been suffering episodic acute facial numbness, generalized rash, acute dizziness after ingestion of certain type of foods including foods that she was regularly consumed in the past.  She has reaction with foods prepared by her  as well at foods prepared by the restaurant. Patient was previously prescribed epinephrine to be used as needed for anaphylactic reactions.  Patient states that she has not had shortness of breath or dyspnea on exertion associated with foods.  However, patient is very concerned with increased frequency such episodes.     Patient was previously found to have abnormal overnight oximetry study in May 2018.  She was referred to sleep study.  However, due to the pandemic, patient has been putting consultation vision on hold.  She continues to have persistent symptoms concerning for sleep apnea.  Patient requests to be referred to sleep medicine for consultation.    Current medicines (including changes today)  Current Outpatient Medications   Medication Sig Dispense Refill   • Cholecalciferol (DIALYVITE VITAMIN D 5000 PO) Take 5,000 Units by mouth every day.     • atorvastatin (LIPITOR) 20 MG Tab Take 1 Tablet by mouth at bedtime. 90 Tablet 1   • Dulaglutide 0.75 MG/0.5ML Solution Pen-injector Inject 0.5 mL under the skin every 7 days. 2 mL 5   • metFORMIN ER (GLUCOPHAGE XR) 500 MG TABLET SR 24 HR Take 1 Tablet by mouth every day. 90 Tablet  "1   • Omega-3 Fatty Acids (FISH OIL) 1000 MG CAPSULE DELAYED RELEASE EC softgel capsule Take 1 Cap by mouth 2 Times a Day. 180 Cap 1     No current facility-administered medications for this visit.     She  has a past medical history of ROMEO (obstructive sleep apnea).    I personally reviewed patient's problem list, allergies, medications, family hx, social hx with patient and update EPIC.     REVIEW OF SYSTEMS:  CONSTITUTIONAL:  Denies night sweats, fatigue, malaise, lethargy, fever or chills.  RESPIRATORY:  Denies cough, wheeze, hemoptysis, or shortness of breath.  CARDIOVASCULAR:  Denies chest pains, palpitations, pedal edema     Objective:     /70 (BP Location: Right arm, Patient Position: Sitting, BP Cuff Size: Adult)   Pulse 71   Temp 36.2 °C (97.2 °F) (Temporal)   Ht 1.549 m (5' 1\")   Wt 83.5 kg (184 lb)   SpO2 96%  Body mass index is 34.77 kg/m².    Physical Exam:  Constitutional: awake, alert, in no distress.  Skin: Warm, dry, good turgor, no rashes, bruises, ulcers in visible areas.  Eye: conjunctiva clear, lids neg for edema or lesions.  Neck: Trachea midline, no masses, no thyromegaly. No cervical or supraclavicular lymphadenopathy  Respiratory: Unlabored respiratory effort, lungs clear to auscultation, no wheezes, no rales.  Cardiovascular: Normal S1, S2, no murmur, no pedal edema.  Abdomen: Soft, non-tender to palpation, active BS, no hernia, no hepatosplenomegaly, negative rebound or guarding.   Psych: Oriented x3, affect and mood wnl, intact judgement and insight.       Assessment and Plan:   The following treatment plan was discussed    1. Type 2 diabetes mellitus without complication, without long-term current use of insulin (HCC)  Chronic, currently taking Metformin  mg daily.  Patient gains a few pounds since last office visit.  She is active, but does not exercise regularly.  She denies any side effect with Metformin.  A1C was 6.8 in 5/2021. A1C was 6.9 in clinic today.   - add " Dulaglutide 0.75 MG/0.5ML Solution Pen-injector; Inject 0.5 mL under the skin every 7 days.  Dispense: 2 mL; Refill: 5 (hopefully also helps with weight loss).   - continue metFORMIN ER (GLUCOPHAGE XR) 500 MG TABLET SR 24 HR; Take 1 Tablet by mouth every day.  Dispense: 90 Tablet; Refill: 1  - CBC WITH DIFFERENTIAL; Future  - Comp Metabolic Panel; Future  - HEMOGLOBIN A1C; Future  - MICROALBUMIN CREAT RATIO URINE; Future  - POCT A1C  - dietary modification, exercise, weight loss  - Annual eye exam: due, patient will schedule  - Regular foot exam  - Discussed prevention and management of hypoglycemia  - Side effects of all medications discussed with patient  - Follow up in 3 months.     2. Pure hypercholesterolemia  Chronic, controlled with Lipitor 20 mg qd, no s/e reported, will continue.    - atorvastatin (LIPITOR) 20 MG Tab; Take 1 Tablet by mouth at bedtime.  Dispense: 90 Tablet; Refill: 1  - Lipid Profile; Future  - dietary modification, exercise, and weight loss.   - avoid alcohol, drugs, tobacco products     3. Obesity (BMI 30-39.9)  - Dulaglutide 0.75 MG/0.5ML Solution Pen-injector; Inject 0.5 mL under the skin every 7 days.  Dispense: 2 mL; Refill: 5  - Patient identified as having weight management issue.  Appropriate orders and counseling given.     4. Nocturnal hypoxia  - REFERRAL TO PULMONARY AND SLEEP MEDICINE    5. HART (nonalcoholic steatohepatitis)  - weight loss  - avoidance of hepatotoxic drugs.     6. Vitamin D insufficiency  - 5000 units of vitamin D daily   - VITAMIN D,25 HYDROXY; Future    7. Need for vaccination  - Hep B Adult 20+    8. PE (physical exam), annual  General health and wellness counseling provided.      - CBC WITH DIFFERENTIAL; Future  - Comp Metabolic Panel; Future  - HEMOGLOBIN A1C; Future  - MICROALBUMIN CREAT RATIO URINE; Future  - Lipid Profile; Future    9. Food allergy  - REFERRAL TO ALLERGY  - EPINEPHrine (EPIPEN 2-KEILY) 0.3 MG/0.3ML Solution Auto-injector solution for  injection; Inject 0.3 mL into the shoulder, thigh, or buttocks one time for 1 dose.  Dispense: 2 Each; Refill: 1      Ly SHAUN Hook M.D.      Followup: Return in about 6 months (around 3/13/2022) for annual PE.    Please note that this dictation was created using voice recognition software. I have made every reasonable attempt to correct obvious errors, but I expect that there are errors of grammar and possibly content that I did not discover before finalizing the note.

## 2021-11-01 ENCOUNTER — APPOINTMENT (OUTPATIENT)
Dept: SLEEP MEDICINE | Facility: MEDICAL CENTER | Age: 51
End: 2021-11-01

## 2022-03-14 ENCOUNTER — APPOINTMENT (OUTPATIENT)
Dept: MEDICAL GROUP | Age: 52
End: 2022-03-14
Payer: COMMERCIAL

## 2022-08-12 ENCOUNTER — HOSPITAL ENCOUNTER (OUTPATIENT)
Dept: LAB | Facility: MEDICAL CENTER | Age: 52
End: 2022-08-12
Attending: FAMILY MEDICINE
Payer: COMMERCIAL

## 2022-08-12 ENCOUNTER — OFFICE VISIT (OUTPATIENT)
Dept: MEDICAL GROUP | Age: 52
End: 2022-08-12
Payer: COMMERCIAL

## 2022-08-12 VITALS
BODY MASS INDEX: 32.1 KG/M2 | WEIGHT: 170 LBS | DIASTOLIC BLOOD PRESSURE: 72 MMHG | SYSTOLIC BLOOD PRESSURE: 124 MMHG | HEART RATE: 60 BPM | TEMPERATURE: 96.7 F | OXYGEN SATURATION: 95 % | HEIGHT: 61 IN

## 2022-08-12 DIAGNOSIS — E66.9 OBESITY (BMI 30-39.9): ICD-10-CM

## 2022-08-12 DIAGNOSIS — G47.34 NOCTURNAL HYPOXIA: ICD-10-CM

## 2022-08-12 DIAGNOSIS — N30.00 ACUTE CYSTITIS WITHOUT HEMATURIA: ICD-10-CM

## 2022-08-12 DIAGNOSIS — E78.00 PURE HYPERCHOLESTEROLEMIA: ICD-10-CM

## 2022-08-12 DIAGNOSIS — Z23 NEED FOR VACCINATION: ICD-10-CM

## 2022-08-12 DIAGNOSIS — Z00.00 PE (PHYSICAL EXAM), ANNUAL: Primary | ICD-10-CM

## 2022-08-12 DIAGNOSIS — K75.81 NASH (NONALCOHOLIC STEATOHEPATITIS): ICD-10-CM

## 2022-08-12 DIAGNOSIS — Z91.018 FOOD ALLERGY: ICD-10-CM

## 2022-08-12 DIAGNOSIS — E11.9 TYPE 2 DIABETES MELLITUS WITHOUT COMPLICATION, WITHOUT LONG-TERM CURRENT USE OF INSULIN (HCC): ICD-10-CM

## 2022-08-12 DIAGNOSIS — Z12.31 ENCOUNTER FOR SCREENING MAMMOGRAM FOR BREAST CANCER: ICD-10-CM

## 2022-08-12 LAB
ALBUMIN SERPL BCP-MCNC: 4.5 G/DL (ref 3.2–4.9)
ALBUMIN/GLOB SERPL: 1.5 G/DL
ALP SERPL-CCNC: 78 U/L (ref 30–99)
ALT SERPL-CCNC: 63 U/L (ref 2–50)
ANION GAP SERPL CALC-SCNC: 11 MMOL/L (ref 7–16)
APPEARANCE UR: CLEAR
AST SERPL-CCNC: 36 U/L (ref 12–45)
BACTERIA #/AREA URNS HPF: ABNORMAL /HPF
BASOPHILS # BLD AUTO: 0.8 % (ref 0–1.8)
BASOPHILS # BLD: 0.06 K/UL (ref 0–0.12)
BILIRUB SERPL-MCNC: 0.6 MG/DL (ref 0.1–1.5)
BILIRUB UR QL STRIP.AUTO: NEGATIVE
BUN SERPL-MCNC: 10 MG/DL (ref 8–22)
CALCIUM SERPL-MCNC: 9.2 MG/DL (ref 8.5–10.5)
CHLORIDE SERPL-SCNC: 105 MMOL/L (ref 96–112)
CHOLEST SERPL-MCNC: 183 MG/DL (ref 100–199)
CO2 SERPL-SCNC: 24 MMOL/L (ref 20–33)
COLOR UR: ABNORMAL
CREAT SERPL-MCNC: 0.62 MG/DL (ref 0.5–1.4)
CREAT UR-MCNC: 61.59 MG/DL
EOSINOPHIL # BLD AUTO: 0.24 K/UL (ref 0–0.51)
EOSINOPHIL NFR BLD: 3 % (ref 0–6.9)
EPI CELLS #/AREA URNS HPF: NEGATIVE /HPF
ERYTHROCYTE [DISTWIDTH] IN BLOOD BY AUTOMATED COUNT: 46.9 FL (ref 35.9–50)
FASTING STATUS PATIENT QL REPORTED: NORMAL
GFR SERPLBLD CREATININE-BSD FMLA CKD-EPI: 105 ML/MIN/1.73 M 2
GLOBULIN SER CALC-MCNC: 3 G/DL (ref 1.9–3.5)
GLUCOSE SERPL-MCNC: 100 MG/DL (ref 65–99)
GLUCOSE UR STRIP.AUTO-MCNC: NEGATIVE MG/DL
HBA1C MFR BLD: 6.1 % (ref 0–5.6)
HCT VFR BLD AUTO: 43.8 % (ref 37–47)
HDLC SERPL-MCNC: 33 MG/DL
HGB BLD-MCNC: 14.2 G/DL (ref 12–16)
HYALINE CASTS #/AREA URNS LPF: ABNORMAL /LPF
IMM GRANULOCYTES # BLD AUTO: 0.02 K/UL (ref 0–0.11)
IMM GRANULOCYTES NFR BLD AUTO: 0.3 % (ref 0–0.9)
INT CON NEG: NEGATIVE
INT CON POS: POSITIVE
KETONES UR STRIP.AUTO-MCNC: NEGATIVE MG/DL
LDLC SERPL CALC-MCNC: 112 MG/DL
LEUKOCYTE ESTERASE UR QL STRIP.AUTO: ABNORMAL
LYMPHOCYTES # BLD AUTO: 2.55 K/UL (ref 1–4.8)
LYMPHOCYTES NFR BLD: 32.2 % (ref 22–41)
MCH RBC QN AUTO: 30.5 PG (ref 27–33)
MCHC RBC AUTO-ENTMCNC: 32.4 G/DL (ref 33.6–35)
MCV RBC AUTO: 94.2 FL (ref 81.4–97.8)
MICRO URNS: ABNORMAL
MICROALBUMIN UR-MCNC: <1.2 MG/DL
MICROALBUMIN/CREAT UR: NORMAL MG/G (ref 0–30)
MONOCYTES # BLD AUTO: 0.63 K/UL (ref 0–0.85)
MONOCYTES NFR BLD AUTO: 8 % (ref 0–13.4)
NEUTROPHILS # BLD AUTO: 4.42 K/UL (ref 2–7.15)
NEUTROPHILS NFR BLD: 55.7 % (ref 44–72)
NITRITE UR QL STRIP.AUTO: POSITIVE
NRBC # BLD AUTO: 0 K/UL
NRBC BLD-RTO: 0 /100 WBC
PH UR STRIP.AUTO: 6 [PH] (ref 5–8)
PLATELET # BLD AUTO: 262 K/UL (ref 164–446)
PMV BLD AUTO: 11.3 FL (ref 9–12.9)
POTASSIUM SERPL-SCNC: 4.3 MMOL/L (ref 3.6–5.5)
PROT SERPL-MCNC: 7.5 G/DL (ref 6–8.2)
PROT UR QL STRIP: NEGATIVE MG/DL
RBC # BLD AUTO: 4.65 M/UL (ref 4.2–5.4)
RBC # URNS HPF: ABNORMAL /HPF
RBC UR QL AUTO: NEGATIVE
SODIUM SERPL-SCNC: 140 MMOL/L (ref 135–145)
SP GR UR STRIP.AUTO: 1.01
TRIGL SERPL-MCNC: 190 MG/DL (ref 0–149)
UROBILINOGEN UR STRIP.AUTO-MCNC: 1 MG/DL
WBC # BLD AUTO: 7.9 K/UL (ref 4.8–10.8)
WBC #/AREA URNS HPF: ABNORMAL /HPF

## 2022-08-12 PROCEDURE — 81001 URINALYSIS AUTO W/SCOPE: CPT

## 2022-08-12 PROCEDURE — 80061 LIPID PANEL: CPT

## 2022-08-12 PROCEDURE — 90471 IMMUNIZATION ADMIN: CPT | Performed by: FAMILY MEDICINE

## 2022-08-12 PROCEDURE — 36415 COLL VENOUS BLD VENIPUNCTURE: CPT

## 2022-08-12 PROCEDURE — 80053 COMPREHEN METABOLIC PANEL: CPT

## 2022-08-12 PROCEDURE — 82043 UR ALBUMIN QUANTITATIVE: CPT

## 2022-08-12 PROCEDURE — 85025 COMPLETE CBC W/AUTO DIFF WBC: CPT

## 2022-08-12 PROCEDURE — 90677 PCV20 VACCINE IM: CPT | Performed by: FAMILY MEDICINE

## 2022-08-12 PROCEDURE — 82570 ASSAY OF URINE CREATININE: CPT

## 2022-08-12 PROCEDURE — 99213 OFFICE O/P EST LOW 20 MIN: CPT | Mod: 25 | Performed by: FAMILY MEDICINE

## 2022-08-12 PROCEDURE — 83036 HEMOGLOBIN GLYCOSYLATED A1C: CPT | Performed by: FAMILY MEDICINE

## 2022-08-12 PROCEDURE — 99396 PREV VISIT EST AGE 40-64: CPT | Mod: 25 | Performed by: FAMILY MEDICINE

## 2022-08-12 RX ORDER — NITROFURANTOIN 25; 75 MG/1; MG/1
100 CAPSULE ORAL 2 TIMES DAILY
Qty: 14 CAPSULE | Refills: 0 | Status: SHIPPED | OUTPATIENT
Start: 2022-08-12 | End: 2022-08-19

## 2022-08-12 RX ORDER — ATORVASTATIN CALCIUM 20 MG/1
20 TABLET, FILM COATED ORAL
Qty: 90 TABLET | Refills: 1 | Status: SHIPPED | OUTPATIENT
Start: 2022-08-12 | End: 2023-02-13 | Stop reason: SDUPTHER

## 2022-08-12 RX ORDER — EPINEPHRINE 0.3 MG/.3ML
0.3 INJECTION SUBCUTANEOUS ONCE
Qty: 2 EACH | Refills: 1 | Status: SHIPPED | OUTPATIENT
Start: 2022-08-12 | End: 2022-08-12

## 2022-08-12 ASSESSMENT — PATIENT HEALTH QUESTIONNAIRE - PHQ9: CLINICAL INTERPRETATION OF PHQ2 SCORE: 0

## 2022-08-12 ASSESSMENT — FIBROSIS 4 INDEX: FIB4 SCORE: 1.43

## 2022-08-12 NOTE — PROGRESS NOTES
Subjective:   CC: Annual physical, dysuria    HPI:     Didi Ontiveros is a 52 y.o. female, established patient of the clinic.     Patient has chronic type 2 diabetes, hyperlipidemia, obesity, Segura, nocturnal hypoxia, food allergy.  Patient lost follow-up since September 2021.  She is off all her medication.  She is active and try to exercise regularly.  She was previously taking metformin 500 mg daily and Trulicity 0.75 mg weekly.  She believes that Trulicity was helping with weight loss.  She lost approximately 14 pounds since last office visit.  She is active and try to exercise regularly.    Patient was previously found to have nocturnal hypoxia concerning for sleep apnea.  She was referred to sleep medicine a few times, however, patient lost her insurance therefore, she was not able to schedule appointment.  She lost about 14 pounds since last office.  She denies any nocturnal or daytime symptoms currently.    Negative visual changes, concerning lesion on her feet, symptoms of polyneuropathy.    Acute concerns:  Patient complains of acute onsets of urinary frequency, urgency, dysuria approximately 7 days ago.  She tried over-the-counter meds with some relief for a few days.  Unfortunately, she experiences relapse of symptoms since yesterday.  She complains of severe dysuria last night.  Negative fever, chills, flank pain, hematuria, pelvic pain.  Patient is not able to produce urine sample during office visit today.    Current medicines (including changes today)  Current Outpatient Medications   Medication Sig Dispense Refill    Dulaglutide 0.75 MG/0.5ML Solution Pen-injector Inject 0.5 mL under the skin every 7 days. 2 mL 5    atorvastatin (LIPITOR) 20 MG Tab Take 1 Tablet by mouth at bedtime. 90 Tablet 1    nitrofurantoin (MACROBID) 100 MG Cap Take 1 Capsule by mouth 2 times a day for 7 days. 14 Capsule 0    Cholecalciferol (DIALYVITE VITAMIN D 5000 PO) Take 5,000 Units by mouth every day.      Omega-3 Fatty  "Acids (FISH OIL) 1000 MG CAPSULE DELAYED RELEASE EC softgel capsule Take 1 Cap by mouth 2 Times a Day. 180 Cap 1     No current facility-administered medications for this visit.     She  has a past medical history of ROMEO (obstructive sleep apnea).    I reviewed patient's problem list, allergies, medications, family hx, social hx with patient and update EPIC.        Objective:     /72 (BP Location: Right arm, Patient Position: Sitting, BP Cuff Size: Adult)   Pulse 60   Temp 35.9 °C (96.7 °F) (Temporal)   Ht 1.549 m (5' 1\")   Wt 77.1 kg (170 lb)   SpO2 95%  Body mass index is 32.12 kg/m².    Physical Exam:  Constitutional: awake, alert, in no distress.  Skin: Warm, dry, good turgor, no rashes, bruises, ulcers in visible areas.  Eye: conjunctiva clear, lids neg for edema or lesions.  Neck: Trachea midline, no masses, no thyromegaly. No cervical or supraclavicular lymphadenopathy  Respiratory: Unlabored respiratory effort, lungs clear to auscultation, no wheezes, no rales.  Cardiovascular: Normal S1, S2, no murmur, no pedal edema.  Abdomen: Obese soft, non-tender to palpation, active BS, no hernia, no hepatosplenomegaly, negative rebound or guarding.   Psych: Oriented x3, affect and mood wnl, intact judgement and insight.     Monofilament testing with a 10 gram force: sensation intact: intact bilaterally  Visual Inspection: Feet without maceration, ulcers, fissures.  Pedal pulses: intact bilaterally     Assessment and Plan:   The following treatment plan was discussed    1. Type 2 diabetes mellitus without complication, without long-term current use of insulin (HCC)  Chronic, previously taking metformin  mg daily and Trulicity 0.75 mg weekly.  She lost her insurance and therefore was not able to follow-up with me since September 2021.  She has been off both medications.  She lost 14 pounds since last office visit with dietary and lifestyle modification.  A1c was 6.1 during office visit today.  She " wishes to restart Trulicity for weight loss.  - Diabetic Monofilament Lower Extremity Exam  - CBC WITH DIFFERENTIAL; Future  - Comp Metabolic Panel; Future  - MICROALBUMIN CREAT RATIO URINE; Future  - POCT Hemoglobin A1C  - Dulaglutide 0.75 MG/0.5ML Solution Pen-injector; Inject 0.5 mL under the skin every 7 days.  Dispense: 2 mL; Refill: 5  - dietary modification, exercise, weight loss  - Annual eye exam: Due, patient will schedule appointment.  - Regular foot exam  - Discussed prevention and management of hypoglycemia  - Side effects of all medications discussed with patient  - Follow up in 6 months.     2. Pure hypercholesterolemia  - Lipid Profile; Future  - atorvastatin (LIPITOR) 20 MG Tab; Take 1 Tablet by mouth at bedtime.  Dispense: 90 Tablet; Refill: 1  - Lipid Profile; Future    3. Obesity (BMI 30-39.9)  - Patient identified as having weight management issue.  Appropriate orders and counseling given.  - Dulaglutide 0.75 MG/0.5ML Solution Pen-injector; Inject 0.5 mL under the skin every 7 days.  Dispense: 2 mL; Refill: 5    4. Nocturnal hypoxia  Lost 14 lbs, no daytime or nocturnal symptoms reported.  Declined follow-up with sleep medicine at this time.  Additional weight loss encouraged  Will continue to monitor.    5. HART (nonalcoholic steatohepatitis)  Lost 14 pounds since last office visit with dietary and lifestyle modification.  Restarted Trulicity for weight loss    6. Food allergy  - EpiPen     7. Need for vaccination  - Pneumococcal Conjugate Vaccine 20-Valent (19 yrs+)    8. Encounter for screening mammogram for breast cancer  - MA-SCREENING MAMMO BILAT W/TOMOSYNTHESIS W/CAD; Future    9. PE (physical exam), annual  General health and wellness counseling provided.        10. Acute cystitis without hematuria  History and exams are concerning for urinary tract infection.  Patient is not able to produce urine sample during office today.  - nitrofurantoin (MACROBID) 100 MG Cap; Take 1 Capsule by  mouth 2 times a day for 7 days.  Dispense: 14 Capsule; Refill: 0      Ly SHAUN Hook M.D.      Followup: Return in about 6 months (around 2/12/2023) for Diabetes.    Please note that this dictation was created using voice recognition software. I have made every reasonable attempt to correct obvious errors, but I expect that there are errors of grammar and possibly content that I did not discover before finalizing the note.

## 2022-08-12 NOTE — PATIENT INSTRUCTIONS
Please get Covid booster and shingle vaccines at your pharmacy.     Please schedule appointment for eye exam and have records faxed to

## 2023-02-10 ENCOUNTER — HOSPITAL ENCOUNTER (OUTPATIENT)
Dept: RADIOLOGY | Facility: MEDICAL CENTER | Age: 53
End: 2023-02-10
Attending: FAMILY MEDICINE
Payer: COMMERCIAL

## 2023-02-10 DIAGNOSIS — Z12.31 ENCOUNTER FOR SCREENING MAMMOGRAM FOR BREAST CANCER: ICD-10-CM

## 2023-02-10 PROCEDURE — 77063 BREAST TOMOSYNTHESIS BI: CPT

## 2023-02-13 ENCOUNTER — HOSPITAL ENCOUNTER (OUTPATIENT)
Facility: MEDICAL CENTER | Age: 53
End: 2023-02-13
Attending: FAMILY MEDICINE
Payer: COMMERCIAL

## 2023-02-13 ENCOUNTER — OFFICE VISIT (OUTPATIENT)
Dept: MEDICAL GROUP | Age: 53
End: 2023-02-13
Payer: COMMERCIAL

## 2023-02-13 VITALS
OXYGEN SATURATION: 93 % | RESPIRATION RATE: 16 BRPM | DIASTOLIC BLOOD PRESSURE: 70 MMHG | BODY MASS INDEX: 33.23 KG/M2 | HEIGHT: 61 IN | TEMPERATURE: 97.7 F | SYSTOLIC BLOOD PRESSURE: 110 MMHG | HEART RATE: 77 BPM | WEIGHT: 176 LBS

## 2023-02-13 DIAGNOSIS — Z01.419 WELL WOMAN EXAM: Primary | ICD-10-CM

## 2023-02-13 DIAGNOSIS — B37.89 CANDIDA RASH OF GROIN: ICD-10-CM

## 2023-02-13 DIAGNOSIS — G56.03 BILATERAL CARPAL TUNNEL SYNDROME: ICD-10-CM

## 2023-02-13 DIAGNOSIS — E66.9 OBESITY (BMI 30-39.9): ICD-10-CM

## 2023-02-13 DIAGNOSIS — G47.33 OSA (OBSTRUCTIVE SLEEP APNEA): ICD-10-CM

## 2023-02-13 DIAGNOSIS — M25.512 ACUTE PAIN OF LEFT SHOULDER: ICD-10-CM

## 2023-02-13 DIAGNOSIS — Z11.51 SPECIAL SCREENING EXAMINATION FOR HUMAN PAPILLOMAVIRUS (HPV): ICD-10-CM

## 2023-02-13 DIAGNOSIS — E11.9 TYPE 2 DIABETES MELLITUS WITHOUT COMPLICATION, WITHOUT LONG-TERM CURRENT USE OF INSULIN (HCC): ICD-10-CM

## 2023-02-13 DIAGNOSIS — Z23 NEED FOR VACCINATION: ICD-10-CM

## 2023-02-13 DIAGNOSIS — K75.81 NASH (NONALCOHOLIC STEATOHEPATITIS): ICD-10-CM

## 2023-02-13 DIAGNOSIS — E78.00 PURE HYPERCHOLESTEROLEMIA: ICD-10-CM

## 2023-02-13 DIAGNOSIS — Z01.419 ENCOUNTER FOR WELL WOMAN EXAM WITH ROUTINE GYNECOLOGICAL EXAM: ICD-10-CM

## 2023-02-13 DIAGNOSIS — Z12.4 ROUTINE CERVICAL SMEAR: ICD-10-CM

## 2023-02-13 LAB
HBA1C MFR BLD: 6.4 % (ref ?–5.8)
POCT INT CON NEG: NEGATIVE
POCT INT CON POS: POSITIVE

## 2023-02-13 PROCEDURE — 99214 OFFICE O/P EST MOD 30 MIN: CPT | Mod: 25 | Performed by: FAMILY MEDICINE

## 2023-02-13 PROCEDURE — 92250 FUNDUS PHOTOGRAPHY W/I&R: CPT | Mod: TC | Performed by: FAMILY MEDICINE

## 2023-02-13 PROCEDURE — 20610 DRAIN/INJ JOINT/BURSA W/O US: CPT | Performed by: FAMILY MEDICINE

## 2023-02-13 PROCEDURE — 99396 PREV VISIT EST AGE 40-64: CPT | Mod: 25 | Performed by: FAMILY MEDICINE

## 2023-02-13 PROCEDURE — 90686 IIV4 VACC NO PRSV 0.5 ML IM: CPT | Performed by: FAMILY MEDICINE

## 2023-02-13 PROCEDURE — 83036 HEMOGLOBIN GLYCOSYLATED A1C: CPT | Performed by: FAMILY MEDICINE

## 2023-02-13 PROCEDURE — 90471 IMMUNIZATION ADMIN: CPT | Performed by: FAMILY MEDICINE

## 2023-02-13 PROCEDURE — 87624 HPV HI-RISK TYP POOLED RSLT: CPT

## 2023-02-13 PROCEDURE — 88175 CYTOPATH C/V AUTO FLUID REDO: CPT

## 2023-02-13 RX ORDER — FLUCONAZOLE 150 MG/1
150 TABLET ORAL
Qty: 6 TABLET | Refills: 0 | Status: SHIPPED | OUTPATIENT
Start: 2023-02-13 | End: 2023-03-21

## 2023-02-13 RX ORDER — MELOXICAM 15 MG/1
15 TABLET ORAL
Qty: 30 TABLET | Refills: 1 | Status: SHIPPED | OUTPATIENT
Start: 2023-02-13 | End: 2024-02-26

## 2023-02-13 RX ORDER — TRIAMCINOLONE ACETONIDE 1 MG/G
OINTMENT TOPICAL
Qty: 60 G | Refills: 0 | Status: SHIPPED | OUTPATIENT
Start: 2023-02-13 | End: 2024-02-26

## 2023-02-13 RX ORDER — METHYLPREDNISOLONE SODIUM SUCCINATE 40 MG/ML
40 INJECTION, POWDER, LYOPHILIZED, FOR SOLUTION INTRAMUSCULAR; INTRAVENOUS ONCE
Status: COMPLETED | OUTPATIENT
Start: 2023-02-13 | End: 2023-02-13

## 2023-02-13 RX ORDER — ATORVASTATIN CALCIUM 20 MG/1
20 TABLET, FILM COATED ORAL
Qty: 90 TABLET | Refills: 1 | Status: SHIPPED | OUTPATIENT
Start: 2023-02-13 | End: 2024-02-26 | Stop reason: SDUPTHER

## 2023-02-13 RX ADMIN — Medication 4 ML: at 14:42

## 2023-02-13 RX ADMIN — METHYLPREDNISOLONE SODIUM SUCCINATE 40 MG: 40 INJECTION, POWDER, LYOPHILIZED, FOR SOLUTION INTRAMUSCULAR; INTRAVENOUS at 11:47

## 2023-02-13 ASSESSMENT — FIBROSIS 4 INDEX: FIB4 SCORE: 0.95

## 2023-02-13 ASSESSMENT — PATIENT HEALTH QUESTIONNAIRE - PHQ9: CLINICAL INTERPRETATION OF PHQ2 SCORE: 0

## 2023-02-13 NOTE — PROGRESS NOTES
Subjective:   CC: WWE, pap     HPI:     Didi Ontiveros is a 53 y.o. female, established patient of the clinic.     , sexually active with male partner.   Contraception: postmenopausal   Hx of STD: negative   Hx of abnormal pap: negative   Denies fever, chills, pelvic pain, dyspareunia, abnormal vaginal bleeding/discharge, genital rash.   Denies nipple bleeding, discharge, breast mass, familial/personal hx of breast/gyn malignancy.   Last mammogram: 2/10/2023, Finding: pending     Patient complained of acute red, pruritic rash on both groins    Acute left shoulder pain for 4 weeks. Shoulder ROM is not affected.   Negative history of left shoulder trauma.   Patient is right handed.    Negative fever, chills, rash.   She tried over-the-counter cream, but did not help.     Patient has history of Segura.  Negative history of alcohol abuse.  Patient is trying to work on weight loss and dietary modification to control his condition.    Patient has bilateral carpal tunnel syndrome, left worse than right.  Symptoms are controlled with conservative treatment.    Patient was previously diagnosed with sleep apnea.  She was not able to tolerate CPAP.  She complains of excessive snoring at night with some daytime symptoms.  She want to discuss treatment options.      Current medicines (including changes today)  Current Outpatient Medications   Medication Sig Dispense Refill    meloxicam (MOBIC) 15 MG tablet Take 1 Tablet by mouth 1 time a day as needed for Severe Pain or Inflammation. With foods 30 Tablet 1    fluconazole (DIFLUCAN) 150 MG tablet Take 1 Tablet by mouth every 7 days for 6 doses. 6 Tablet 0    triamcinolone acetonide (KENALOG) 0.1 % Ointment Apply to groins twice daily until symptoms resolve 60 g 0    atorvastatin (LIPITOR) 20 MG Tab Take 1 Tablet by mouth at bedtime. 90 Tablet 1    Dulaglutide 0.75 MG/0.5ML Solution Pen-injector Inject 0.5 mL under the skin every 7 days. 2 mL 5    Cholecalciferol (DIALYVITE  "VITAMIN D 5000 PO) Take 5,000 Units by mouth every day.      Omega-3 Fatty Acids (FISH OIL) 1000 MG CAPSULE DELAYED RELEASE EC softgel capsule Take 1 Cap by mouth 2 Times a Day. 180 Cap 1     No current facility-administered medications for this visit.     She  has a past medical history of ROMEO (obstructive sleep apnea).    I reviewed patient's problem list, allergies, medications, family hx, social hx with patient and update EPIC.        Objective:     /70 (BP Location: Right arm, Patient Position: Sitting, BP Cuff Size: Adult)   Pulse 77   Temp 36.5 °C (97.7 °F) (Temporal)   Resp 16   Ht 1.549 m (5' 1\")   Wt 79.8 kg (176 lb)   SpO2 93%  Body mass index is 33.25 kg/m².    Physical Exam:  Constitutional: awake, alert, in no distress.  Skin: Warm, dry, good turgor, no rashes, bruises, ulcers in visible areas.  Eye: conjunctiva clear, lids neg for edema or lesions.  Neck: Trachea midline, no masses, no thyromegaly. No cervical or supraclavicular lymphadenopathy  Respiratory: Unlabored respiratory effort, lungs clear to auscultation, no wheezes, no rales.  Cardiovascular: Normal S1, S2, no murmur, no pedal edema.  Abdomen: Soft, non-tender to palpation, active BS, no hernia, no hepatosplenomegaly, negative rebound or guarding.   Psych: Oriented x3, affect and mood wnl, intact judgement and insight.   GYN: Unremarkable external genitalia. Negative abnormal vaginal discharge or bleeding, no vaginal rash, cervix in mid position, no concerning lesions on cervix, no cervical motion tenderness, uterus mid position with normal size & shape, no adnexal fullness/mass appreciated on bimanual exam.   -Erythematous rash noted at bilateral groins and lower pelvic with active borders and central clearing.  L Shoulder exam:  No visible deformity or asymmetry. No joint effusion, bruises, hematoma. No crepitus.   Neuro: Normal sensation lateral shoulder and normal DTRs UE.  PROM: within normal limits    AROM: within normal " limits   Apley’s scratch test: Asymmetrical, left worse than right  Cross-body test: negative    Drop arm test: negative    Empty can test: positive   Infraspinatus/teres minor exam: negative    Lift-off test: positive   Neer’s impingement test: positive  .   Yergason’s test: negative    Sulcus sign: negative    Relocation: negative      Assessment and Plan:   The following treatment plan was discussed    1. Routine cervical smear  - THINPREP PAP WITH HPV    2. Special screening examination for human papillomavirus (HPV)  - THINPREP PAP WITH HPV    3. Encounter for well woman exam with routine gynecological exam  - THINPREP PAP WITH HPV    4. Need for vaccination  - INFLUENZA VACCINE QUAD INJ (PF)    5. Well woman exam  Labs per orders  Immunization per orders   Patient was counseled about skin care, diet, supplements, exercises.   Preventive cares reviewed, discussed, and updated as appropriate.       6. Obesity (BMI 30-39.9)  - Patient identified as having weight management issue.  Appropriate orders and counseling given.  - Dulaglutide 0.75 MG/0.5ML Solution Pen-injector; Inject 0.5 mL under the skin every 7 days.  Dispense: 2 mL; Refill: 5    7. Pure hypercholesterolemia  Chronic, controlled with Lipitor 20 mg daily, no s/e reported, will continue.    - Lipid Profile; Future  - atorvastatin (LIPITOR) 20 MG Tab; Take 1 Tablet by mouth at bedtime.  Dispense: 90 Tablet; Refill: 1  - dietary modification, exercise, and weight loss.   - avoid alcohol, drugs, tobacco products     8. Type 2 diabetes mellitus without complication, without long-term current use of insulin (HCC)  Chronic, controlled with Trulicity 0.75 mg weekly.  Her A1c was 6.4 during office visit today.  Negative visual changes, concerning lesion on her feet, symptoms of polyneuropathy.  - POCT A1C  - POCT Retinal Eye Exam  - CBC WITH DIFFERENTIAL; Future  - Comp Metabolic Panel; Future  - MICROALBUMIN CREAT RATIO URINE; Future  - Dulaglutide 0.75  MG/0.5ML Solution Pen-injector; Inject 0.5 mL under the skin every 7 days.  Dispense: 2 mL; Refill: 5  - HEMOGLOBIN A1C; Future  - dietary modification, exercise, weight loss  - Annual eye exam  - Regular foot exam  - Discussed prevention and management of hypoglycemia  - Side effects of all medications discussed with patient  - Follow up in 6 months.     9. Acute pain of left shoulder  Acute left shoulder pain without history of left shoulder trauma.  - Steroid injection: methylPREDNISolone (SOLU-MEDROL) 40 MG injection 40 mg  - meloxicam (MOBIC) 15 MG tablet; Take 1 Tablet by mouth 1 time a day as needed for Severe Pain or Inflammation. With foods  Dispense: 30 Tablet; Refill: 1  - lidocaine (XYLOCAINE) 1 % injection 4 mL  -Patient declined physical therapy.  Home rehab exercises handout provided.  -Follow-up in 3 months as needed.    10. Candida rash of groin  History and exam are concerning for candidal infection.  - fluconazole (DIFLUCAN) 150 MG tablet; Take 1 Tablet by mouth every 7 days for 6 doses.  Dispense: 6 Tablet; Refill: 0  - triamcinolone acetonide (KENALOG) 0.1 % Ointment; Apply to groins twice daily until symptoms resolve  Dispense: 60 g; Refill: 0  -Preventive measures discussed.    11. HART (nonalcoholic steatohepatitis)  -Weight loss, avoidance of hepatotoxic drugs.    12. Bilateral carpal tunnel syndrome, L worse than R  Chronic, stable, controlled with behavioral modification.    13. ROMEO (obstructive sleep apnea)  Chronic, unable to tolerate CPAP.  Patient is not interested in following up with sleep medicine at this time.  Patient is trying to work on dietary and lifestyle modification for weight loss.  She wishes to explore other treatment options for sleep apnea.  I discussed treatment options with patient.  She is interested in dental consultation for oral appliances.  - Referral to Dentistry   - Weight loss    Shoulder Injection Procedure Note  Indication: Acute left shoulder  pain  Location: Left shoulder    PARQ: PARQ conference held including but not limited to the risk of steroid flare, hypopigmentation, infection, bleeding, and tendon/cartilage damage. Patient expressed understanding and wished to proceed. Verbal consent obtained.    Procedure:   Pt seated upright, arms hanging to side, small amount of traction to flexed elbow.  Injection site was identified and cleansed thoroughly with alcohol. Using lateral approach, a mixture of 40 mg of Solu-Medrol + 4 ml of 1% Lidocaine solution was injected into pt's left shoulder w/o resistance. The injected site was dressed with sterile bandage. Passive gentle ROM was done to distribute the medicine. Pt endorses relief of symptoms.   Pt tolerates the procedure well. No immediate complications noted. Aftercare instructions provided.       Ashley Hook M.D.      Followup: Return in about 6 months (around 8/13/2023) for Diabetes.    Please note that this dictation was created using voice recognition software. I have made every reasonable attempt to correct obvious errors, but I expect that there are errors of grammar and possibly content that I did not discover before finalizing the note.

## 2023-02-14 LAB — AMBIGUOUS DTTM AMBI4: NORMAL

## 2023-02-15 LAB
CYTOLOGY REG CYTOL: NORMAL
HPV HR 12 DNA CVX QL NAA+PROBE: NEGATIVE
HPV16 DNA SPEC QL NAA+PROBE: NEGATIVE
HPV18 DNA SPEC QL NAA+PROBE: NEGATIVE
RETINAL SCREEN: NEGATIVE
SPECIMEN SOURCE: NORMAL

## 2023-09-29 ENCOUNTER — OFFICE VISIT (OUTPATIENT)
Dept: URGENT CARE | Facility: PHYSICIAN GROUP | Age: 53
End: 2023-09-29
Payer: COMMERCIAL

## 2023-09-29 VITALS
SYSTOLIC BLOOD PRESSURE: 122 MMHG | BODY MASS INDEX: 32.66 KG/M2 | HEIGHT: 61 IN | OXYGEN SATURATION: 96 % | WEIGHT: 173 LBS | RESPIRATION RATE: 17 BRPM | DIASTOLIC BLOOD PRESSURE: 70 MMHG | TEMPERATURE: 97.3 F | HEART RATE: 83 BPM

## 2023-09-29 DIAGNOSIS — B36.9 FUNGAL INFECTION OF SKIN: ICD-10-CM

## 2023-09-29 PROCEDURE — 3078F DIAST BP <80 MM HG: CPT | Performed by: NURSE PRACTITIONER

## 2023-09-29 PROCEDURE — 99213 OFFICE O/P EST LOW 20 MIN: CPT | Performed by: NURSE PRACTITIONER

## 2023-09-29 PROCEDURE — 3074F SYST BP LT 130 MM HG: CPT | Performed by: NURSE PRACTITIONER

## 2023-09-29 RX ORDER — CLOTRIMAZOLE 1 %
1 CREAM (GRAM) TOPICAL 2 TIMES DAILY
Qty: 60 G | Refills: 1 | Status: SHIPPED | OUTPATIENT
Start: 2023-09-29 | End: 2023-10-27

## 2023-09-29 RX ORDER — FLUCONAZOLE 150 MG/1
150 TABLET ORAL
Qty: 4 TABLET | Refills: 0 | Status: SHIPPED | OUTPATIENT
Start: 2023-09-29 | End: 2023-10-21

## 2023-09-29 ASSESSMENT — ENCOUNTER SYMPTOMS: FEVER: 0

## 2023-09-29 ASSESSMENT — FIBROSIS 4 INDEX: FIB4 SCORE: 0.95

## 2023-09-29 NOTE — PROGRESS NOTES
Subjective:     Didi Ontiveros is a 53 y.o. female who presents for Rash (R inner thigh spread to pelvis )      Presents for itchy rash to bilateral thighs, increasing in size. Was previously treated for a fungal infection earlier this year, with return x 1 month. States she been using the triamcinolone cream, that it helps the itch but not the rash. Glucose is controlled.          Rash  This is a recurrent problem. The affected locations include the groin. Pertinent negatives include no fever.       Past Medical History:   Diagnosis Date    ROMEO (obstructive sleep apnea)        Past Surgical History:   Procedure Laterality Date    PRIMARY C SECTION         Social History     Socioeconomic History    Marital status:      Spouse name: Not on file    Number of children: Not on file    Years of education: Not on file    Highest education level: Not on file   Occupational History    Not on file   Tobacco Use    Smoking status: Never    Smokeless tobacco: Never   Vaping Use    Vaping Use: Never used   Substance and Sexual Activity    Alcohol use: No    Drug use: No    Sexual activity: Yes     Partners: Male     Birth control/protection: Condom   Other Topics Concern    Not on file   Social History Narrative    Not on file     Social Determinants of Health     Financial Resource Strain: Not on file   Food Insecurity: Not on file   Transportation Needs: Not on file   Physical Activity: Not on file   Stress: Not on file   Social Connections: Not on file   Intimate Partner Violence: Not on file   Housing Stability: Not on file        Family History   Problem Relation Age of Onset    No Known Problems Mother     No Known Problems Father     Diabetes Sister     No Known Problems Brother         Allergies   Allergen Reactions    Pcn [Penicillins]        Review of Systems   Constitutional:  Negative for fever.   Skin:  Positive for itching and rash.   All other systems reviewed and are negative.       Objective:   BP  What Type Of Note Output Would You Prefer (Optional)?: Standard Output "122/70 (BP Location: Right arm, Patient Position: Sitting, BP Cuff Size: Adult)   Pulse 83   Temp 36.3 °C (97.3 °F) (Temporal)   Resp 17   Ht 1.549 m (5' 1\")   Wt 78.5 kg (173 lb)   SpO2 96%   BMI 32.69 kg/m²     Physical Exam  Vitals reviewed.   Pulmonary:      Effort: Pulmonary effort is normal.   Skin:     General: Skin is warm and dry.      Findings: Rash present. Rash is not pustular or vesicular.             Comments: Rash to bilateral upper inner thighs, and towards groin: No satellite lesions. Dry rash, with scaling edges. Not on genitalia.    Neurological:      Mental Status: She is alert and oriented to person, place, and time.         Assessment/Plan:   1. Fungal infection of skin  - clotrimazole (LOTRIMIN) 1 % Cream; Apply 1 Application topically 2 times a day for 28 days.  Dispense: 60 g; Refill: 1  - fluconazole (DIFLUCAN) 150 MG tablet; Take 1 Tablet by mouth every 7 days for 4 doses.  Dispense: 4 Tablet; Refill: 0  --Clear with gentle soap and water. Completely dry the area, including between skin folds.   -Avoid scratching to cause any skin breakdown.    Follow up with PCP. Follow up for worsening symptoms, signs of infection, fever, pain, or any other concerns.    -Afebrile. Presents with signs of tinea cruris.  Discussed using topical therapy between oral doses due to size of the area impacted. No signs of secondary bacterial infection.     Differential diagnosis, natural history, supportive care, and indications for immediate follow-up discussed.  " How Severe Are Your Spot(S)?: mild Have Your Spot(S) Been Treated In The Past?: has not been treated Hpi Title: Evaluation of Skin Lesions

## 2023-09-30 NOTE — PATIENT INSTRUCTIONS
-Clear with gentle soap and water. Completely dry the area, including between skin folds.   -Avoid scratching to cause any skin breakdown.    Follow up with PCP. Follow up for worsening symptoms, signs of infection, fever, pain, or any other concerns.

## 2024-02-06 ENCOUNTER — APPOINTMENT (OUTPATIENT)
Dept: MEDICAL GROUP | Facility: MEDICAL CENTER | Age: 54
End: 2024-02-06
Payer: COMMERCIAL

## 2024-02-09 ENCOUNTER — APPOINTMENT (OUTPATIENT)
Dept: MEDICAL GROUP | Facility: MEDICAL CENTER | Age: 54
End: 2024-02-09
Payer: COMMERCIAL

## 2024-02-26 ENCOUNTER — OFFICE VISIT (OUTPATIENT)
Dept: MEDICAL GROUP | Facility: MEDICAL CENTER | Age: 54
End: 2024-02-26
Payer: COMMERCIAL

## 2024-02-26 VITALS
DIASTOLIC BLOOD PRESSURE: 74 MMHG | WEIGHT: 176.7 LBS | BODY MASS INDEX: 33.36 KG/M2 | TEMPERATURE: 97.8 F | HEART RATE: 76 BPM | SYSTOLIC BLOOD PRESSURE: 120 MMHG | HEIGHT: 61 IN | OXYGEN SATURATION: 97 %

## 2024-02-26 DIAGNOSIS — G47.33 OSA (OBSTRUCTIVE SLEEP APNEA): ICD-10-CM

## 2024-02-26 DIAGNOSIS — Z00.00 PE (PHYSICAL EXAM), ANNUAL: Primary | ICD-10-CM

## 2024-02-26 DIAGNOSIS — E66.9 OBESITY (BMI 30-39.9): ICD-10-CM

## 2024-02-26 DIAGNOSIS — B37.2 CANDIDAL INTERTRIGO: ICD-10-CM

## 2024-02-26 DIAGNOSIS — Z23 NEED FOR VACCINATION: ICD-10-CM

## 2024-02-26 DIAGNOSIS — E78.00 PURE HYPERCHOLESTEROLEMIA: ICD-10-CM

## 2024-02-26 DIAGNOSIS — Z11.4 ENCOUNTER FOR SCREENING FOR HIV: ICD-10-CM

## 2024-02-26 DIAGNOSIS — G56.03 BILATERAL CARPAL TUNNEL SYNDROME: ICD-10-CM

## 2024-02-26 DIAGNOSIS — K76.0 NAFLD (NONALCOHOLIC FATTY LIVER DISEASE): ICD-10-CM

## 2024-02-26 DIAGNOSIS — E11.9 TYPE 2 DIABETES MELLITUS WITHOUT COMPLICATION, WITHOUT LONG-TERM CURRENT USE OF INSULIN (HCC): ICD-10-CM

## 2024-02-26 LAB
HBA1C MFR BLD: 6.5 % (ref ?–5.8)
POCT INT CON NEG: NEGATIVE
POCT INT CON POS: POSITIVE

## 2024-02-26 PROCEDURE — 90472 IMMUNIZATION ADMIN EACH ADD: CPT | Performed by: FAMILY MEDICINE

## 2024-02-26 PROCEDURE — 90471 IMMUNIZATION ADMIN: CPT | Performed by: FAMILY MEDICINE

## 2024-02-26 PROCEDURE — 90746 HEPB VACCINE 3 DOSE ADULT IM: CPT | Performed by: FAMILY MEDICINE

## 2024-02-26 PROCEDURE — 3074F SYST BP LT 130 MM HG: CPT | Performed by: FAMILY MEDICINE

## 2024-02-26 PROCEDURE — 90686 IIV4 VACC NO PRSV 0.5 ML IM: CPT | Performed by: FAMILY MEDICINE

## 2024-02-26 PROCEDURE — 99214 OFFICE O/P EST MOD 30 MIN: CPT | Mod: 25 | Performed by: FAMILY MEDICINE

## 2024-02-26 PROCEDURE — 3078F DIAST BP <80 MM HG: CPT | Performed by: FAMILY MEDICINE

## 2024-02-26 PROCEDURE — 92250 FUNDUS PHOTOGRAPHY W/I&R: CPT | Mod: TC | Performed by: FAMILY MEDICINE

## 2024-02-26 PROCEDURE — 83036 HEMOGLOBIN GLYCOSYLATED A1C: CPT | Performed by: FAMILY MEDICINE

## 2024-02-26 PROCEDURE — 99396 PREV VISIT EST AGE 40-64: CPT | Mod: 25 | Performed by: FAMILY MEDICINE

## 2024-02-26 RX ORDER — CLOTRIMAZOLE AND BETAMETHASONE DIPROPIONATE 10; .64 MG/G; MG/G
1 CREAM TOPICAL 2 TIMES DAILY
Qty: 45 G | Refills: 1 | Status: SHIPPED | OUTPATIENT
Start: 2024-02-26

## 2024-02-26 RX ORDER — FLUCONAZOLE 150 MG/1
150 TABLET ORAL
Qty: 6 TABLET | Refills: 0 | Status: SHIPPED | OUTPATIENT
Start: 2024-02-26 | End: 2024-04-02

## 2024-02-26 RX ORDER — ATORVASTATIN CALCIUM 20 MG/1
20 TABLET, FILM COATED ORAL
Qty: 90 TABLET | Refills: 3 | Status: SHIPPED | OUTPATIENT
Start: 2024-02-26

## 2024-02-26 RX ORDER — SEMAGLUTIDE 0.68 MG/ML
INJECTION, SOLUTION SUBCUTANEOUS
Qty: 3 ML | Refills: 2 | Status: SHIPPED | OUTPATIENT
Start: 2024-02-26 | End: 2024-04-22

## 2024-02-26 ASSESSMENT — FIBROSIS 4 INDEX: FIB4 SCORE: 0.97

## 2024-02-26 ASSESSMENT — PATIENT HEALTH QUESTIONNAIRE - PHQ9: CLINICAL INTERPRETATION OF PHQ2 SCORE: 0

## 2024-02-26 NOTE — PROGRESS NOTES
Verbal consent was acquired by the patient to use [employer] ambient listening note generation during this visit: YES    Subjective:   CC: aPE, worsening carpal tunnel syndrome, acute rash, sleep apnea    HPI:   History of Present Illness  The patient presents to the office for follow-up of multiple medical concerns.    She did not have a chance to do her blood work that was ordered last year. Her employer does her blood tests every year. She went to [place] for 6 months to take care of her mother in the beginning of 2023. Her last eye exam was in 03/2023 in [place].    She has a diagnosis of type 2 diabetes. She was on Trulicity, but she ran out of it. She has been off Trulicity for 3 months now. She did lose weight with Trulicity, but she gained it again.  Denies any acute visual changes, concerning lesion on her feet, symptoms of polyneuropathy.  Her A1c was 6.5 during office visit today.    Her carpal tunnel syndrome is worsening. She has a bump on her hand that has been painful for the last 6 months. It swells up sometimes. It does not hurt right now, but sometimes her hands hurt. She would like to see a hand doctor. She is not taking any medication. She denies any trauma to her left wrist.    She tried a CPAP in the past for sleep apnea, but she could not tolerate it. Her  has told her that she snores a lot. She wakes up in the morning feeling tired.  She also complains of daytime hypersomnolence and chronic fatigue.  She is working second shift right now, but she thinks she is coming back to first shift again.  Shift work is not helping with her energy level.      She is still taking atorvastatin for high cholesterol.    She had a rash last year. She was given medication, which helped. She went to [facility], and they gave her the same medication. The rash was going away, but now it is coming back. She has been scratching on it. It is itchy.  Denies history of excessive sweating.     The patient denies  "alcohol intake.   She received her influenza vaccine today.    Objective:     Exam:  /74 (BP Location: Right arm, Patient Position: Sitting, BP Cuff Size: Adult)   Pulse 76   Temp 36.6 °C (97.8 °F) (Temporal)   Ht 1.549 m (5' 1\")   Wt 80.2 kg (176 lb 11.2 oz)   SpO2 97%   BMI 33.39 kg/m²  Body mass index is 33.39 kg/m².    Constitutional: awake, alert, in no distress.  Skin: Warm, dry, good turgor, no rashes, bruises, ulcers in visible areas.  -Circular, erythematous, nontender patches noted at the inner thighs bilaterally.  The patches have active borders and central clearing.  There is moderate excoriation from scratching.    Eye: conjunctiva clear, lids neg for edema or lesions.  Neck: Trachea midline, no masses, no thyromegaly. No cervical or supraclavicular lymphadenopathy  Respiratory: Unlabored respiratory effort, lungs clear to auscultation, no wheezes, no rales.  Cardiovascular: Normal S1, S2, no murmur, no pedal edema.  Psych: Oriented x3, affect and mood wnl, intact judgement and insight.     Monofilament testing with a 10 gram force: sensation intact: intact bilaterally  Visual Inspection: Feet without maceration, ulcers, fissures.  Pedal pulses: intact bilaterally     A chaperone was offered to the patient during today's exam.: Patient declined.    Results  Laboratory Studies  Labs were reviewed with the patient.    Assessment & Plan:     1. Pure hypercholesterolemia  Chronic, controlled with Lipitor 20 mg daily, no s/e reported, will continue.    - Lipid Profile; Future  - atorvastatin (LIPITOR) 20 MG Tab; Take 1 Tablet by mouth at bedtime.  Dispense: 90 Tablet; Refill: 3  - dietary modification, exercise, and weight loss.   - avoid alcohol, drugs, tobacco products     2. Type 2 diabetes mellitus without complication, without long-term current use of insulin (HCC)  Chronic, A1c was 6.5 during office visit today.  Patient was on Trulicity 0.75 mg weekly in the past, but discontinued 3 months " ago as she ran out of medication.  Denies any visual changes, concerning lesion on her feet, symptoms of polyneuropathy. Body mass index is 33.39 kg/m².   - POCT  A1C: 6.3  - MICROALBUMIN CREAT RATIO URINE; Future  - POCT Retinal Eye Exam  - Diabetic Monofilament Lower Extremity Exam  - Semaglutide,0.25 or 0.5MG/DOS, (OZEMPIC, 0.25 OR 0.5 MG/DOSE,) 2 MG/3ML Solution Pen-injector; Inject 0.25 mg under the skin every 7 days for 28 days, THEN 0.5 mg every 7 days for 28 days.  Dispense: 3 mL; Refill: 2  - dietary modification, exercise, weight loss  - Annual eye exam  - Regular foot exam  - Discussed prevention and management of hypoglycemia  - Side effects of all medications discussed with patient  - Follow up in 3 months.     3. NAFLD (nonalcoholic fatty liver disease)  Recent labs was notable for mildly elevated LFT.  Patient had negative hepatitis B and hepatitis C screening a few years ago.  Denies history of chronic alcoholism.  Patient does not consume Tylenol regularly.  She was found to have fatty liver disease per ultrasound done in 2014.  Patient is asymptomatic.  - US-RUQ; Future  -Avoid regular consumption of Tylenol and alcohol    4. Obesity (BMI 30-39.9)  - Patient identified as having weight management issue.  Appropriate orders and counseling given.    5. Need for vaccination  - INFLUENZA VACCINE QUAD INJ (PF)  - Hepatitis B Vaccine Adult IM    6. Encounter for screening for HIV  - HIV AG/AB COMBO ASSAY SCREENING; Future    7. PE (physical exam), annual  Labs per orders  Immunization reviewed and discussed.  Patient was counseled about  diet, supplements, exercises.   Preventive cares reviewed, discussed, and updated as appropriate.     - CBC WITH DIFFERENTIAL; Future  - Comp Metabolic Panel; Future  - HEMOGLOBIN A1C; Future  - Lipid Profile; Future  - HIV AG/AB COMBO ASSAY SCREENING; Future    8. ROMEO (obstructive sleep apnea)  Patient was diagnosed with sleep apnea in the past.  Patient was not able to  tolerate CPAP.  She continues to display symptoms of sleep apnea.  I discussed treatment option with patient.  She is interested in oral appliances.  - Referral to Dentistry    9. Candidal intertrigo  - fluconazole (DIFLUCAN) 150 MG tablet; Take 1 Tablet by mouth every 7 days for 6 doses.  Dispense: 6 Tablet; Refill: 0  - clotrimazole-betamethasone (LOTRISONE) 1-0.05 % Cream; Apply 1 Application topically 2 times a day.  Dispense: 45 g; Refill: 1    10. Bilateral carpal tunnel syndrome, L worse than R  Patient complains of worsening carpal tunnel syndrome on the left hand.  She is no longer able to manage symptoms with conservative treatment.  She wishes to be referred to hand surgery for consultation.  - Referral to Hand Surgery         Please note that this dictation was created using voice recognition software. I have made every reasonable attempt to correct obvious errors, but I expect that there are errors of grammar and possibly content that I did not discover before finalizing the note.

## 2024-02-27 LAB — RETINAL SCREEN: NEGATIVE

## 2024-04-19 ENCOUNTER — TELEPHONE (OUTPATIENT)
Dept: MEDICAL GROUP | Facility: MEDICAL CENTER | Age: 54
End: 2024-04-19
Payer: COMMERCIAL

## 2024-04-19 NOTE — TELEPHONE ENCOUNTER
FINAL PRIOR AUTHORIZATION STATUS:    1.  Name of Medication & Dose: ozempic      2. Prior Auth Status: Approved through 4/19/2025     3. Action Taken: Pharmacy Notified: no Patient Notified: yes    diana Mann...

## 2024-04-25 ENCOUNTER — HOSPITAL ENCOUNTER (OUTPATIENT)
Dept: RADIOLOGY | Facility: MEDICAL CENTER | Age: 54
End: 2024-04-25
Attending: FAMILY MEDICINE
Payer: COMMERCIAL

## 2024-04-25 DIAGNOSIS — K76.0 NAFLD (NONALCOHOLIC FATTY LIVER DISEASE): ICD-10-CM

## 2024-04-25 PROCEDURE — 76705 ECHO EXAM OF ABDOMEN: CPT

## 2024-11-26 ENCOUNTER — HOSPITAL ENCOUNTER (OUTPATIENT)
Dept: RADIOLOGY | Facility: MEDICAL CENTER | Age: 54
End: 2024-11-26
Attending: FAMILY MEDICINE
Payer: COMMERCIAL

## 2024-11-26 DIAGNOSIS — Z12.31 VISIT FOR SCREENING MAMMOGRAM: ICD-10-CM

## 2024-11-26 PROCEDURE — 77067 SCR MAMMO BI INCL CAD: CPT

## 2024-12-09 ENCOUNTER — APPOINTMENT (OUTPATIENT)
Dept: MEDICAL GROUP | Facility: MEDICAL CENTER | Age: 54
End: 2024-12-09
Payer: COMMERCIAL

## 2024-12-09 ENCOUNTER — TELEPHONE (OUTPATIENT)
Dept: MEDICAL GROUP | Facility: MEDICAL CENTER | Age: 54
End: 2024-12-09

## 2024-12-09 DIAGNOSIS — R79.89 ELEVATED LFTS: ICD-10-CM

## 2024-12-09 DIAGNOSIS — E11.9 TYPE 2 DIABETES MELLITUS WITHOUT COMPLICATION, WITHOUT LONG-TERM CURRENT USE OF INSULIN (HCC): ICD-10-CM

## 2024-12-09 NOTE — TELEPHONE ENCOUNTER
Annamaria,     Could you enter patient's last A1C from outside labs into Epic?     Her labs done in 9/2024 were fairly abnormal. Please advise patient to have CMP and A1C repeated before her next office visit on 12/16/2024. Fasting is not required.     Thanks    Ashley Hook M.D.

## 2024-12-16 ENCOUNTER — OFFICE VISIT (OUTPATIENT)
Dept: MEDICAL GROUP | Facility: MEDICAL CENTER | Age: 54
End: 2024-12-16
Payer: COMMERCIAL

## 2024-12-16 VITALS
HEART RATE: 72 BPM | SYSTOLIC BLOOD PRESSURE: 116 MMHG | TEMPERATURE: 96.7 F | BODY MASS INDEX: 34.42 KG/M2 | WEIGHT: 182.32 LBS | HEIGHT: 61 IN | OXYGEN SATURATION: 94 % | DIASTOLIC BLOOD PRESSURE: 70 MMHG

## 2024-12-16 DIAGNOSIS — K76.0 NAFLD (NONALCOHOLIC FATTY LIVER DISEASE): ICD-10-CM

## 2024-12-16 DIAGNOSIS — E11.9 TYPE 2 DIABETES MELLITUS WITHOUT COMPLICATION, WITHOUT LONG-TERM CURRENT USE OF INSULIN (HCC): ICD-10-CM

## 2024-12-16 DIAGNOSIS — Z23 NEED FOR INFLUENZA VACCINATION: ICD-10-CM

## 2024-12-16 DIAGNOSIS — B17.9 ACUTE HEPATITIS: ICD-10-CM

## 2024-12-16 DIAGNOSIS — E78.00 PURE HYPERCHOLESTEROLEMIA: ICD-10-CM

## 2024-12-16 DIAGNOSIS — G56.03 BILATERAL CARPAL TUNNEL SYNDROME: ICD-10-CM

## 2024-12-16 PROCEDURE — 99214 OFFICE O/P EST MOD 30 MIN: CPT | Mod: 25 | Performed by: FAMILY MEDICINE

## 2024-12-16 PROCEDURE — 90471 IMMUNIZATION ADMIN: CPT | Performed by: FAMILY MEDICINE

## 2024-12-16 PROCEDURE — 3078F DIAST BP <80 MM HG: CPT | Performed by: FAMILY MEDICINE

## 2024-12-16 PROCEDURE — 90656 IIV3 VACC NO PRSV 0.5 ML IM: CPT | Performed by: FAMILY MEDICINE

## 2024-12-16 PROCEDURE — 3074F SYST BP LT 130 MM HG: CPT | Performed by: FAMILY MEDICINE

## 2024-12-16 RX ORDER — METFORMIN HYDROCHLORIDE 500 MG/1
1000 TABLET, EXTENDED RELEASE ORAL 2 TIMES DAILY
Qty: 180 TABLET | Refills: 1 | Status: SHIPPED | OUTPATIENT
Start: 2024-12-16

## 2024-12-16 RX ORDER — SEMAGLUTIDE 1.34 MG/ML
1 INJECTION, SOLUTION SUBCUTANEOUS
Qty: 3 ML | Refills: 3 | Status: SHIPPED | OUTPATIENT
Start: 2024-12-16

## 2024-12-16 RX ORDER — SEMAGLUTIDE 0.68 MG/ML
0.5 INJECTION, SOLUTION SUBCUTANEOUS
Qty: 3 ML | Refills: 3 | Status: SHIPPED | OUTPATIENT
Start: 2024-12-16

## 2024-12-16 RX ORDER — ATORVASTATIN CALCIUM 20 MG/1
20 TABLET, FILM COATED ORAL
Qty: 90 TABLET | Refills: 3 | Status: SHIPPED | OUTPATIENT
Start: 2024-12-16

## 2024-12-18 NOTE — PROGRESS NOTES
Verbal consent was acquired by the patient to use [email or web address] ambient listening note generation during this visit: YES    CC: Diabetes follow-up    Assessment & Plan:     1. Need for influenza vaccination  - INFLUENZA VACCINE TRI INJ (PF)    2. Bilateral carpal tunnel syndrome, L worse than R  History and exam are concerning for worsening bilateral carpal tunnel syndrome.  Patient had EMG/nerve conduction study done with another provider.  Records are not available for review.  Patient wishes to be refer to hand surgeon for consultation.  - Referral to Hand Surgery    3. Type 2 diabetes mellitus without complication, without long-term current use of insulin (HCC)  Chronic, poorly controlled, recent A1c was 10.8.  Patient discontinued Ozempic 6 months ago.  She lost follow-up with me since 2/2024.  She denies any side effects with Ozempic.  - Semaglutide,0.25 or 0.5MG/DOS, (OZEMPIC, 0.25 OR 0.5 MG/DOSE,) 2 MG/3ML Solution Pen-injector; Inject 0.5 mg under the skin every 7 days.  Dispense: 3 mL; Refill: 3  - Semaglutide, 1 MG/DOSE, (OZEMPIC, 1 MG/DOSE,) 4 MG/3ML Solution Pen-injector; Inject 1 mg under the skin every 7 days.  Dispense: 3 mL; Refill: 3  - start metFORMIN ER (GLUCOPHAGE XR) 500 MG TABLET SR 24 HR; Take 2 Tablets by mouth 2 times a day.  Dispense: 180 Tablet; Refill: 1  - CBC WITH DIFFERENTIAL; Future  - Comp Metabolic Panel; Future  - HEMOGLOBIN A1C; Future  - MICROALBUMIN CREAT RATIO URINE; Future  - dietary modification, exercise, weight loss  - Annual eye exam  - Regular foot exam  - Discussed prevention and management of hypoglycemia  - Side effects of all medications discussed with patient  - Follow up in 3 months.     4. Acute hepatitis  5. NAFLD  Labs done in 9/2024 was notable for markedly elevated ALT, AST, GGT, LDH.  Patient is asymptomatic.  She does not drink alcohol.  She was tested negative for viral hepatitis in the past.  Liver ultrasound done in 4/2024 showed hepatic steatosis.  Patient  was on Lipitor in the past, but discontinued Lipitor about 3 months prior to labs.  Patient remains asymptomatic today.  Will repeat labs for reassessment.  - Comp Metabolic Panel; Future  - Avoid alcohol, excessive consumption of Tylenol    5. Pure hypercholesterolemia  Chronic, persistent hyperlipidemia.  Patient discontinued Lipitor approximately 6 months ago.  Given history of poorly controlled type 2 diabetes, encouraged patient to restart Lipitor.  - atorvastatin (LIPITOR) 20 MG Tab; Take 1 Tablet by mouth at bedtime.  Dispense: 90 Tablet; Refill: 3  - Lipid Profile; Future  - dietary modification, regular exercise  - weight loss   - avoid alcohol, illicit drugs, tobacco products              Subjective:       HPI:     History of Present Illness  The patient presents for evaluation of multiple concerns.    She was diagnosed with carpal tunnel syndrome, previously treated by Dr. Rosales with an injection in March, but reports no improvement. A nerve conduction study was done around June or July, but she has not received the results.  She is interested in carpal tunnel release surgery.  She wishes to be referred back to Dr. Butts for surgical evaluation.    Patient has chronic type 2 diabetes.  She was previously prescribed Ozempic.  Unfortunately, she discontinued Ozempic for about 5-6 months due to lack of refills (?), with no adverse effects.  Her recent A1c was 10.8.  She missed appointment to follow-up with me since 2/2024.  She also discontinued Lipitor for unclear reason.  Current medications include vitamin D3 and omega supplements.  She was previously on metformin, she does not have any side effect with metformin.    Labs done in 9/2024 showed markedly elevated liver enzymes, GGT, LDH.  Patient is asymptomatic.  Denies history of viral hepatitis.  Patient does not drink alcohol or consumes Tylenol.  She does not currently takes any prescribed medication.      SOCIAL HISTORY  - Does not drink  "alcohol  - Does not smoke    FAMILY HISTORY  - Mother has kidney issues  - Grandmother's sister  from liver disease    MEDICATIONS  - Current: Vitamin D3, omega supplements  - Discontinued: Ozempic, metformin        Current Outpatient Medications:     Semaglutide,0.25 or 0.5MG/DOS, (OZEMPIC, 0.25 OR 0.5 MG/DOSE,) 2 MG/3ML Solution Pen-injector, Inject 0.5 mg under the skin every 7 days., Disp: 3 mL, Rfl: 3    Semaglutide, 1 MG/DOSE, (OZEMPIC, 1 MG/DOSE,) 4 MG/3ML Solution Pen-injector, Inject 1 mg under the skin every 7 days., Disp: 3 mL, Rfl: 3    metFORMIN ER (GLUCOPHAGE XR) 500 MG TABLET SR 24 HR, Take 2 Tablets by mouth 2 times a day., Disp: 180 Tablet, Rfl: 1    atorvastatin (LIPITOR) 20 MG Tab, Take 1 Tablet by mouth at bedtime., Disp: 90 Tablet, Rfl: 3    Cholecalciferol (DIALYVITE VITAMIN D 5000 PO), Take 5,000 Units by mouth every day., Disp: , Rfl:     Omega-3 Fatty Acids (FISH OIL) 1000 MG CAPSULE DELAYED RELEASE EC softgel capsule, Take 1 Cap by mouth 2 Times a Day., Disp: 180 Cap, Rfl: 1     Objective:     Exam:  /70 (BP Location: Left arm, Patient Position: Sitting, BP Cuff Size: Adult long)   Pulse 72   Temp 35.9 °C (96.7 °F) (Temporal)   Ht 1.549 m (5' 1\")   Wt 82.7 kg (182 lb 5.1 oz)   SpO2 94%   BMI 34.45 kg/m²  Body mass index is 34.45 kg/m².    Constitutional: awake, alert, in no distress.  Skin: Warm, dry, good turgor, no rashes, bruises, ulcers in visible areas.  Eye: conjunctiva clear, lids neg for edema or lesions.  Neck: Trachea midline, no masses, no thyromegaly. No cervical or supraclavicular lymphadenopathy  Respiratory: Unlabored respiratory effort, lungs clear to auscultation, no wheezes, no rales.  Cardiovascular: Normal S1, S2, no murmur, no pedal edema.  Abdomen: Soft, non-tender to palpation, active BS, no hernia, no hepatosplenomegaly, negative rebound or guarding.   Psych: Oriented x3, affect and mood wnl, intact judgement and insight.         Return in about 3 " months (around 3/16/2025) for Multiple issues.          Please note that this dictation was created using voice recognition software. I have made every reasonable attempt to correct obvious errors, but I expect that there are errors of grammar and possibly content that I did not discover before finalizing the note.

## 2024-12-26 ENCOUNTER — HOSPITAL ENCOUNTER (OUTPATIENT)
Dept: LAB | Facility: MEDICAL CENTER | Age: 54
End: 2024-12-26
Attending: FAMILY MEDICINE
Payer: COMMERCIAL

## 2024-12-26 DIAGNOSIS — B17.9 ACUTE HEPATITIS: ICD-10-CM

## 2024-12-26 LAB
ALBUMIN SERPL BCP-MCNC: 4.3 G/DL (ref 3.2–4.9)
ALBUMIN/GLOB SERPL: 1.3 G/DL
ALP SERPL-CCNC: 109 U/L (ref 30–99)
ALT SERPL-CCNC: 137 U/L (ref 2–50)
ANION GAP SERPL CALC-SCNC: 9 MMOL/L (ref 7–16)
AST SERPL-CCNC: 64 U/L (ref 12–45)
BILIRUB SERPL-MCNC: 0.5 MG/DL (ref 0.1–1.5)
BUN SERPL-MCNC: 13 MG/DL (ref 8–22)
CALCIUM ALBUM COR SERPL-MCNC: 8.9 MG/DL (ref 8.5–10.5)
CALCIUM SERPL-MCNC: 9.1 MG/DL (ref 8.5–10.5)
CHLORIDE SERPL-SCNC: 103 MMOL/L (ref 96–112)
CO2 SERPL-SCNC: 25 MMOL/L (ref 20–33)
CREAT SERPL-MCNC: 0.51 MG/DL (ref 0.5–1.4)
GFR SERPLBLD CREATININE-BSD FMLA CKD-EPI: 109 ML/MIN/1.73 M 2
GLOBULIN SER CALC-MCNC: 3.3 G/DL (ref 1.9–3.5)
GLUCOSE SERPL-MCNC: 95 MG/DL (ref 65–99)
POTASSIUM SERPL-SCNC: 3.8 MMOL/L (ref 3.6–5.5)
PROT SERPL-MCNC: 7.6 G/DL (ref 6–8.2)
SODIUM SERPL-SCNC: 137 MMOL/L (ref 135–145)

## 2024-12-26 PROCEDURE — 80053 COMPREHEN METABOLIC PANEL: CPT

## 2024-12-26 PROCEDURE — 36415 COLL VENOUS BLD VENIPUNCTURE: CPT

## 2024-12-27 DIAGNOSIS — B17.9 ACUTE HEPATITIS: ICD-10-CM

## 2024-12-27 DIAGNOSIS — K76.0 NAFLD (NONALCOHOLIC FATTY LIVER DISEASE): ICD-10-CM

## 2025-01-08 DIAGNOSIS — E11.9 TYPE 2 DIABETES MELLITUS WITHOUT COMPLICATION, WITHOUT LONG-TERM CURRENT USE OF INSULIN (HCC): ICD-10-CM

## 2025-01-09 RX ORDER — METFORMIN HYDROCHLORIDE 500 MG/1
1000 TABLET, EXTENDED RELEASE ORAL 2 TIMES DAILY
Qty: 360 TABLET | Refills: 1 | Status: SHIPPED | OUTPATIENT
Start: 2025-01-09

## 2025-01-09 NOTE — TELEPHONE ENCOUNTER
Received request via: Patient    Was the patient seen in the last year in this department? Yes    Does the patient have an active prescription (recently filled or refills available) for medication(s) requested? No    Does the patient have senior living Plus and need 100-day supply? (This applies to ALL medications) Patient does not have SCP

## 2025-01-27 PROBLEM — G56.02 CARPAL TUNNEL SYNDROME ON LEFT: Status: ACTIVE | Noted: 2025-01-27

## 2025-02-07 ENCOUNTER — APPOINTMENT (OUTPATIENT)
Dept: URGENT CARE | Facility: PHYSICIAN GROUP | Age: 55
End: 2025-02-07
Payer: COMMERCIAL

## 2025-02-12 ENCOUNTER — OFFICE VISIT (OUTPATIENT)
Dept: URGENT CARE | Facility: PHYSICIAN GROUP | Age: 55
End: 2025-02-12
Payer: COMMERCIAL

## 2025-02-12 VITALS
TEMPERATURE: 97.3 F | RESPIRATION RATE: 18 BRPM | SYSTOLIC BLOOD PRESSURE: 118 MMHG | WEIGHT: 174.9 LBS | BODY MASS INDEX: 33.02 KG/M2 | DIASTOLIC BLOOD PRESSURE: 70 MMHG | OXYGEN SATURATION: 94 % | HEIGHT: 61 IN | HEART RATE: 75 BPM

## 2025-02-12 DIAGNOSIS — R11.2 NAUSEA VOMITING AND DIARRHEA: ICD-10-CM

## 2025-02-12 DIAGNOSIS — R19.7 NAUSEA VOMITING AND DIARRHEA: ICD-10-CM

## 2025-02-12 PROCEDURE — 3078F DIAST BP <80 MM HG: CPT | Performed by: STUDENT IN AN ORGANIZED HEALTH CARE EDUCATION/TRAINING PROGRAM

## 2025-02-12 PROCEDURE — 3074F SYST BP LT 130 MM HG: CPT | Performed by: STUDENT IN AN ORGANIZED HEALTH CARE EDUCATION/TRAINING PROGRAM

## 2025-02-12 PROCEDURE — 99214 OFFICE O/P EST MOD 30 MIN: CPT | Performed by: STUDENT IN AN ORGANIZED HEALTH CARE EDUCATION/TRAINING PROGRAM

## 2025-02-12 RX ORDER — ONDANSETRON 4 MG/1
4 TABLET, ORALLY DISINTEGRATING ORAL EVERY 6 HOURS PRN
Qty: 15 TABLET | Refills: 0 | Status: SHIPPED | OUTPATIENT
Start: 2025-02-12

## 2025-02-12 ASSESSMENT — ENCOUNTER SYMPTOMS
FEVER: 0
CHILLS: 0
NAUSEA: 1
ABDOMINAL PAIN: 1
FLANK PAIN: 0
CONSTIPATION: 0
BLOOD IN STOOL: 0
WHEEZING: 0
VOMITING: 1
COUGH: 0
SHORTNESS OF BREATH: 0
PALPITATIONS: 0
DIARRHEA: 1

## 2025-02-12 NOTE — PROGRESS NOTES
"Subjective     Didi Ontiveros is a 55 y.o. female who presents with Diarrhea (Stomach pain, diarrhea, on and off x 1 month)            Didi is a 55 y.o. female who presents to urgent care with nausea, vomiting and diarrhea.  Patient has had symptoms on and off since December.  Patient was started on Ozempic in December by her PCP.  States she did not remember she did not take Ozempic due to accidentally leaving medication at home when she traveled out of the country.  She did travel to [place] last month.  Patient states she will have 2 days where she does not have any symptoms and then days where she experiences just nausea/vomiting and other days when she experiences diarrhea.  When she does experience diarrhea reports watery diarrhea about 2-4 times a day. No recent antibiotic use.  No recent hospitalization.  No fever/chills.  No blood in stool.  Patient did take Pepto-Bismol for a few days which she did feel like it helped.    Diarrhea   The problem has been waxing and waning. Associated symptoms include abdominal pain and vomiting. Pertinent negatives include no chills, coughing or fever.       Review of Systems   Constitutional:  Negative for chills, fever and malaise/fatigue.   Respiratory:  Negative for cough, shortness of breath and wheezing.    Cardiovascular:  Negative for chest pain and palpitations.   Gastrointestinal:  Positive for abdominal pain, diarrhea, nausea and vomiting. Negative for blood in stool, constipation and melena.   Genitourinary:  Negative for dysuria, flank pain, frequency, hematuria and urgency.   All other systems reviewed and are negative.             Objective     /70   Pulse 75   Temp 36.3 °C (97.3 °F)   Resp 18   Ht 1.549 m (5' 1\")   Wt 79.3 kg (174 lb 14.4 oz)   SpO2 94%   BMI 33.05 kg/m²      Physical Exam  Vitals reviewed.   Constitutional:       General: She is not in acute distress.     Appearance: Normal appearance. She is not ill-appearing, " toxic-appearing or diaphoretic.   HENT:      Head: Normocephalic and atraumatic.      Nose: Nose normal.   Eyes:      Extraocular Movements: Extraocular movements intact.      Conjunctiva/sclera: Conjunctivae normal.      Pupils: Pupils are equal, round, and reactive to light.   Cardiovascular:      Rate and Rhythm: Normal rate.   Pulmonary:      Effort: Pulmonary effort is normal.   Abdominal:      General: Abdomen is flat. Bowel sounds are normal. There is no distension.      Palpations: Abdomen is soft.      Tenderness: There is no abdominal tenderness. There is no right CVA tenderness, left CVA tenderness, guarding or rebound.   Skin:     General: Skin is warm and dry.   Neurological:      General: No focal deficit present.      Mental Status: She is alert and oriented to person, place, and time.                                  Assessment & Plan  Nausea vomiting and diarrhea  - N/V/D on and off since December.  - Patient was started on Ozempic in December when symptoms started.  Advised N/V/D are side effects to Ozempic.  - Patient reports recent travel to [place] last month. Ordered stool culture which patient will return to lab.    Orders:    CULTURE STOOL; Future    ondansetron (ZOFRAN ODT) 4 MG TABLET DISPERSIBLE; Take 1 Tablet by mouth every 6 hours as needed for Nausea/Vomiting.           Differential diagnoses, supportive care measures and indications for immediate follow-up discussed with patient. Pathogenesis of diagnosis discussed including typical length and natural progression.  Follow up with PCP. ER precautions discussed.    Instructed to return to urgent care or nearest emergency department if symptoms fail to improve, for any change in condition, further concerns, or new concerning symptoms.    Patient states understanding and agrees with the plan of care and discharge instructions.                     My total time spent caring for the patient on the day of the encounter was 30 minutes including  continued history of available exam, discussing differential diagnosis, plan of care, supportive care, appropriate follow-up and indications for immediate follow-up.This does not include time spent on separately billable procedures/tests.

## 2025-02-14 ENCOUNTER — HOSPITAL ENCOUNTER (OUTPATIENT)
Facility: MEDICAL CENTER | Age: 55
End: 2025-02-14
Attending: STUDENT IN AN ORGANIZED HEALTH CARE EDUCATION/TRAINING PROGRAM
Payer: COMMERCIAL

## 2025-02-14 DIAGNOSIS — R19.7 NAUSEA VOMITING AND DIARRHEA: ICD-10-CM

## 2025-02-14 DIAGNOSIS — R11.2 NAUSEA VOMITING AND DIARRHEA: ICD-10-CM

## 2025-02-14 PROCEDURE — 87046 STOOL CULTR AEROBIC BACT EA: CPT

## 2025-02-14 PROCEDURE — 87045 FECES CULTURE AEROBIC BACT: CPT

## 2025-02-14 PROCEDURE — 87899 AGENT NOS ASSAY W/OPTIC: CPT

## 2025-02-15 LAB
E COLI SXT1+2 STL IA: NORMAL
SIGNIFICANT IND 70042: NORMAL
SITE SITE: NORMAL
SOURCE SOURCE: NORMAL

## 2025-02-17 LAB
BACTERIA STL CULT: NORMAL
E COLI SXT1+2 STL IA: NORMAL
SIGNIFICANT IND 70042: NORMAL
SITE SITE: NORMAL
SOURCE SOURCE: NORMAL

## 2025-02-18 ENCOUNTER — HOSPITAL ENCOUNTER (OUTPATIENT)
Dept: LAB | Facility: MEDICAL CENTER | Age: 55
End: 2025-02-18
Attending: NURSE PRACTITIONER
Payer: COMMERCIAL

## 2025-02-18 DIAGNOSIS — E78.00 PURE HYPERCHOLESTEROLEMIA: ICD-10-CM

## 2025-02-18 DIAGNOSIS — E11.9 TYPE 2 DIABETES MELLITUS WITHOUT COMPLICATION, WITHOUT LONG-TERM CURRENT USE OF INSULIN (HCC): ICD-10-CM

## 2025-02-18 LAB
BASOPHILS # BLD AUTO: 0.7 % (ref 0–1.8)
BASOPHILS # BLD: 0.06 K/UL (ref 0–0.12)
EOSINOPHIL # BLD AUTO: 0.18 K/UL (ref 0–0.51)
EOSINOPHIL NFR BLD: 2 % (ref 0–6.9)
ERYTHROCYTE [DISTWIDTH] IN BLOOD BY AUTOMATED COUNT: 45.8 FL (ref 35.9–50)
EST. AVERAGE GLUCOSE BLD GHB EST-MCNC: 154 MG/DL
HBA1C MFR BLD: 7 % (ref 4–5.6)
HCT VFR BLD AUTO: 45.3 % (ref 37–47)
HGB BLD-MCNC: 15.3 G/DL (ref 12–16)
IMM GRANULOCYTES # BLD AUTO: 0.03 K/UL (ref 0–0.11)
IMM GRANULOCYTES NFR BLD AUTO: 0.3 % (ref 0–0.9)
LYMPHOCYTES # BLD AUTO: 2.53 K/UL (ref 1–4.8)
LYMPHOCYTES NFR BLD: 27.8 % (ref 22–41)
MCH RBC QN AUTO: 31.8 PG (ref 27–33)
MCHC RBC AUTO-ENTMCNC: 33.8 G/DL (ref 32.2–35.5)
MCV RBC AUTO: 94.2 FL (ref 81.4–97.8)
MONOCYTES # BLD AUTO: 0.67 K/UL (ref 0–0.85)
MONOCYTES NFR BLD AUTO: 7.4 % (ref 0–13.4)
NEUTROPHILS # BLD AUTO: 5.63 K/UL (ref 1.82–7.42)
NEUTROPHILS NFR BLD: 61.8 % (ref 44–72)
NRBC # BLD AUTO: 0 K/UL
NRBC BLD-RTO: 0 /100 WBC (ref 0–0.2)
PLATELET # BLD AUTO: 250 K/UL (ref 164–446)
PMV BLD AUTO: 11.4 FL (ref 9–12.9)
RBC # BLD AUTO: 4.81 M/UL (ref 4.2–5.4)
WBC # BLD AUTO: 9.1 K/UL (ref 4.8–10.8)

## 2025-02-18 PROCEDURE — 36415 COLL VENOUS BLD VENIPUNCTURE: CPT

## 2025-02-18 PROCEDURE — 80053 COMPREHEN METABOLIC PANEL: CPT

## 2025-02-18 PROCEDURE — 82043 UR ALBUMIN QUANTITATIVE: CPT

## 2025-02-18 PROCEDURE — 85025 COMPLETE CBC W/AUTO DIFF WBC: CPT

## 2025-02-18 PROCEDURE — 82570 ASSAY OF URINE CREATININE: CPT

## 2025-02-18 PROCEDURE — 83036 HEMOGLOBIN GLYCOSYLATED A1C: CPT

## 2025-02-18 PROCEDURE — 80061 LIPID PANEL: CPT

## 2025-02-19 ENCOUNTER — RESULTS FOLLOW-UP (OUTPATIENT)
Dept: URGENT CARE | Facility: CLINIC | Age: 55
End: 2025-02-19

## 2025-02-19 LAB
ALBUMIN SERPL BCP-MCNC: 4.3 G/DL (ref 3.2–4.9)
ALBUMIN/GLOB SERPL: 1.3 G/DL
ALP SERPL-CCNC: 86 U/L (ref 30–99)
ALT SERPL-CCNC: 99 U/L (ref 2–50)
ANION GAP SERPL CALC-SCNC: 12 MMOL/L (ref 7–16)
AST SERPL-CCNC: 58 U/L (ref 12–45)
BILIRUB SERPL-MCNC: 0.6 MG/DL (ref 0.1–1.5)
BUN SERPL-MCNC: 11 MG/DL (ref 8–22)
CALCIUM ALBUM COR SERPL-MCNC: 9 MG/DL (ref 8.5–10.5)
CALCIUM SERPL-MCNC: 9.2 MG/DL (ref 8.5–10.5)
CHLORIDE SERPL-SCNC: 104 MMOL/L (ref 96–112)
CHOLEST SERPL-MCNC: 149 MG/DL (ref 100–199)
CO2 SERPL-SCNC: 25 MMOL/L (ref 20–33)
CREAT SERPL-MCNC: 0.78 MG/DL (ref 0.5–1.4)
CREAT UR-MCNC: 219 MG/DL
FASTING STATUS PATIENT QL REPORTED: NORMAL
GFR SERPLBLD CREATININE-BSD FMLA CKD-EPI: 88 ML/MIN/1.73 M 2
GLOBULIN SER CALC-MCNC: 3.2 G/DL (ref 1.9–3.5)
GLUCOSE SERPL-MCNC: 79 MG/DL (ref 65–99)
HDLC SERPL-MCNC: 38 MG/DL
LDLC SERPL CALC-MCNC: 89 MG/DL
MICROALBUMIN UR-MCNC: <1.2 MG/DL
MICROALBUMIN/CREAT UR: NORMAL MG/G (ref 0–30)
POTASSIUM SERPL-SCNC: 3.9 MMOL/L (ref 3.6–5.5)
PROT SERPL-MCNC: 7.5 G/DL (ref 6–8.2)
SODIUM SERPL-SCNC: 141 MMOL/L (ref 135–145)
TRIGL SERPL-MCNC: 111 MG/DL (ref 0–149)

## 2025-02-20 ENCOUNTER — RESULTS FOLLOW-UP (OUTPATIENT)
Dept: MEDICAL GROUP | Facility: MEDICAL CENTER | Age: 55
End: 2025-02-20

## 2025-02-26 ENCOUNTER — APPOINTMENT (OUTPATIENT)
Dept: RADIOLOGY | Facility: MEDICAL CENTER | Age: 55
End: 2025-02-26
Attending: FAMILY MEDICINE
Payer: COMMERCIAL

## 2025-02-26 DIAGNOSIS — B17.9 ACUTE HEPATITIS: ICD-10-CM

## 2025-02-26 DIAGNOSIS — K76.0 NAFLD (NONALCOHOLIC FATTY LIVER DISEASE): ICD-10-CM

## 2025-02-26 PROCEDURE — 76705 ECHO EXAM OF ABDOMEN: CPT

## 2025-02-28 ENCOUNTER — RESULTS FOLLOW-UP (OUTPATIENT)
Dept: MEDICAL GROUP | Facility: MEDICAL CENTER | Age: 55
End: 2025-02-28

## 2025-07-10 ENCOUNTER — OFFICE VISIT (OUTPATIENT)
Dept: URGENT CARE | Facility: PHYSICIAN GROUP | Age: 55
End: 2025-07-10
Payer: COMMERCIAL

## 2025-07-10 VITALS
TEMPERATURE: 97.1 F | RESPIRATION RATE: 16 BRPM | OXYGEN SATURATION: 97 % | BODY MASS INDEX: 33.61 KG/M2 | HEIGHT: 61 IN | WEIGHT: 178 LBS | SYSTOLIC BLOOD PRESSURE: 122 MMHG | DIASTOLIC BLOOD PRESSURE: 64 MMHG | HEART RATE: 79 BPM

## 2025-07-10 DIAGNOSIS — H01.001 BLEPHARITIS OF RIGHT UPPER EYELID, UNSPECIFIED TYPE: Primary | ICD-10-CM

## 2025-07-10 PROCEDURE — 3074F SYST BP LT 130 MM HG: CPT | Performed by: FAMILY MEDICINE

## 2025-07-10 PROCEDURE — 3078F DIAST BP <80 MM HG: CPT | Performed by: FAMILY MEDICINE

## 2025-07-10 PROCEDURE — 99213 OFFICE O/P EST LOW 20 MIN: CPT | Performed by: FAMILY MEDICINE

## 2025-07-10 RX ORDER — DOXYCYCLINE HYCLATE 100 MG
100 TABLET ORAL 2 TIMES DAILY
Qty: 14 TABLET | Refills: 0 | Status: SHIPPED | OUTPATIENT
Start: 2025-07-10 | End: 2025-07-17

## 2025-07-10 ASSESSMENT — ENCOUNTER SYMPTOMS
MYALGIAS: 0
EYE REDNESS: 0
EYE DISCHARGE: 0
NAUSEA: 0
WEIGHT LOSS: 0
VOMITING: 0

## 2025-07-10 ASSESSMENT — FIBROSIS 4 INDEX: FIB4 SCORE: 1.33

## 2025-07-10 NOTE — PROGRESS NOTES
"Subjective     Didi Ontiveros is a 55 y.o. female who presents with Eye Problem (Right eye swelling 1 week ago, then all around right eye was swollen on Friday with pain going up to temple. Now there is a bump on the eyelid and generalized eye pain.)            1 week right upper eyelid swelling. Possible small stye. Right sided HA. 7/10 severity. Some pain with eye movement.  No rash. No fever.  No vision change. No conjunctiva redness or drainage. No other aggravating or alleviating factors.          Review of Systems   Constitutional:  Negative for malaise/fatigue and weight loss.   Eyes:  Negative for discharge and redness.   Gastrointestinal:  Negative for nausea and vomiting.   Musculoskeletal:  Negative for joint pain and myalgias.   Skin:  Negative for itching and rash.              Objective     /64 (BP Location: Right arm, Patient Position: Sitting, BP Cuff Size: Adult)   Pulse 79   Temp 36.2 °C (97.1 °F) (Temporal)   Resp 16   Ht 1.549 m (5' 1\")   Wt 80.7 kg (178 lb)   SpO2 97%   BMI 33.63 kg/m²      Physical Exam  Constitutional:       General: She is not in acute distress.     Appearance: Normal appearance. She is well-developed.   HENT:      Head: Normocephalic and atraumatic.      Right Ear: Tympanic membrane normal.      Left Ear: Tympanic membrane normal.   Eyes:      Extraocular Movements: Extraocular movements intact.      Pupils: Pupils are equal, round, and reactive to light.     Cardiovascular:      Rate and Rhythm: Normal rate and regular rhythm.      Heart sounds: Normal heart sounds. No murmur heard.  Pulmonary:      Effort: Pulmonary effort is normal.      Breath sounds: Normal breath sounds. No wheezing.   Skin:     General: Skin is warm and dry.      Findings: No rash.   Neurological:      Mental Status: She is alert.       1. Blepharitis of right upper eyelid, unspecified type  doxycycline (VIBRAMYCIN) 100 MG Tab        Differential diagnosis, natural history, supportive " care, and indications for immediate follow-up were discussed.     Swelling of eyelid is only notable finding on clinical exam.  Concerning that Didi has periorbital pain and headache.  Limited fundus exam unremarkable.  Recommend warm compress and massage.  Will Rx oral antibiotic.  If pain worsens or vision changes recommend to emergency department for further evaluation.

## 2025-07-11 ENCOUNTER — PHARMACY VISIT (OUTPATIENT)
Dept: PHARMACY | Facility: MEDICAL CENTER | Age: 55
End: 2025-07-11
Payer: COMMERCIAL

## 2025-07-11 PROCEDURE — RXMED WILLOW AMBULATORY MEDICATION CHARGE: Performed by: INTERNAL MEDICINE

## 2025-07-11 RX ORDER — ZOSTER VACCINE RECOMBINANT, ADJUVANTED 50 MCG/0.5
0.5 KIT INTRAMUSCULAR
Qty: 0.5 ML | Refills: 0 | OUTPATIENT
Start: 2025-07-11

## 2025-08-12 ENCOUNTER — OFFICE VISIT (OUTPATIENT)
Dept: MEDICAL GROUP | Facility: MEDICAL CENTER | Age: 55
End: 2025-08-12
Payer: COMMERCIAL

## 2025-08-12 VITALS
BODY MASS INDEX: 33.22 KG/M2 | HEART RATE: 75 BPM | OXYGEN SATURATION: 97 % | HEIGHT: 61 IN | SYSTOLIC BLOOD PRESSURE: 116 MMHG | WEIGHT: 175.93 LBS | TEMPERATURE: 97.2 F | DIASTOLIC BLOOD PRESSURE: 62 MMHG

## 2025-08-12 DIAGNOSIS — E66.9 OBESITY (BMI 30-39.9): ICD-10-CM

## 2025-08-12 DIAGNOSIS — G47.33 OSA (OBSTRUCTIVE SLEEP APNEA): ICD-10-CM

## 2025-08-12 DIAGNOSIS — E55.9 VITAMIN D INSUFFICIENCY: Primary | ICD-10-CM

## 2025-08-12 DIAGNOSIS — E11.65 TYPE 2 DIABETES MELLITUS WITH HYPERGLYCEMIA, WITHOUT LONG-TERM CURRENT USE OF INSULIN (HCC): ICD-10-CM

## 2025-08-12 DIAGNOSIS — Z78.9 INTOLERANCE OF CONTINUOUS POSITIVE AIRWAY PRESSURE (CPAP) VENTILATION: ICD-10-CM

## 2025-08-12 DIAGNOSIS — E78.00 PURE HYPERCHOLESTEROLEMIA: ICD-10-CM

## 2025-08-12 DIAGNOSIS — B37.2 CANDIDIASIS, INTERTRIGO: ICD-10-CM

## 2025-08-12 DIAGNOSIS — Z00.00 PE (PHYSICAL EXAM), ANNUAL: ICD-10-CM

## 2025-08-12 PROBLEM — G56.02 CARPAL TUNNEL SYNDROME ON LEFT: Status: RESOLVED | Noted: 2025-01-27 | Resolved: 2025-08-12

## 2025-08-12 LAB
HBA1C MFR BLD: 11.8 % (ref ?–5.8)
POCT INT CON NEG: NEGATIVE
POCT INT CON POS: POSITIVE

## 2025-08-12 PROCEDURE — 3074F SYST BP LT 130 MM HG: CPT | Performed by: FAMILY MEDICINE

## 2025-08-12 PROCEDURE — 3078F DIAST BP <80 MM HG: CPT | Performed by: FAMILY MEDICINE

## 2025-08-12 PROCEDURE — 99396 PREV VISIT EST AGE 40-64: CPT | Performed by: FAMILY MEDICINE

## 2025-08-12 PROCEDURE — 83036 HEMOGLOBIN GLYCOSYLATED A1C: CPT | Mod: QW | Performed by: FAMILY MEDICINE

## 2025-08-12 PROCEDURE — 92228 IMG RTA DETC/MNTR DS PHY/QHP: CPT | Performed by: FAMILY MEDICINE

## 2025-08-12 PROCEDURE — 99214 OFFICE O/P EST MOD 30 MIN: CPT | Mod: 25 | Performed by: FAMILY MEDICINE

## 2025-08-12 RX ORDER — TRIAMCINOLONE ACETONIDE 1 MG/G
OINTMENT TOPICAL
Qty: 80 G | Refills: 0 | Status: SHIPPED | OUTPATIENT
Start: 2025-08-12

## 2025-08-12 RX ORDER — GLIMEPIRIDE 2 MG/1
2 TABLET ORAL DAILY
Qty: 100 TABLET | Refills: 3 | Status: SHIPPED | OUTPATIENT
Start: 2025-08-12 | End: 2026-09-16

## 2025-08-12 RX ORDER — METFORMIN HYDROCHLORIDE 500 MG/1
1000 TABLET, EXTENDED RELEASE ORAL 2 TIMES DAILY
Qty: 360 TABLET | Refills: 1 | Status: SHIPPED | OUTPATIENT
Start: 2025-08-12

## 2025-08-12 RX ORDER — FLUCONAZOLE 150 MG/1
150 TABLET ORAL
Qty: 3 TABLET | Refills: 0 | Status: SHIPPED | OUTPATIENT
Start: 2025-08-12 | End: 2025-08-19

## 2025-08-12 RX ORDER — ATORVASTATIN CALCIUM 20 MG/1
20 TABLET, FILM COATED ORAL
Qty: 90 TABLET | Refills: 3 | Status: SHIPPED | OUTPATIENT
Start: 2025-08-12

## 2025-08-12 ASSESSMENT — FIBROSIS 4 INDEX: FIB4 SCORE: 1.33

## 2025-08-12 ASSESSMENT — PATIENT HEALTH QUESTIONNAIRE - PHQ9: CLINICAL INTERPRETATION OF PHQ2 SCORE: 0

## 2025-08-15 LAB — RETINAL SCREEN: NEGATIVE

## 2025-08-18 ENCOUNTER — RESULTS FOLLOW-UP (OUTPATIENT)
Dept: MEDICAL GROUP | Facility: MEDICAL CENTER | Age: 55
End: 2025-08-18
Payer: COMMERCIAL